# Patient Record
Sex: FEMALE | Race: BLACK OR AFRICAN AMERICAN | NOT HISPANIC OR LATINO | URBAN - METROPOLITAN AREA
[De-identification: names, ages, dates, MRNs, and addresses within clinical notes are randomized per-mention and may not be internally consistent; named-entity substitution may affect disease eponyms.]

---

## 2020-08-23 ENCOUNTER — EMERGENCY (EMERGENCY)
Facility: HOSPITAL | Age: 55
LOS: 1 days | Discharge: HOME | End: 2020-08-23
Attending: EMERGENCY MEDICINE | Admitting: EMERGENCY MEDICINE
Payer: COMMERCIAL

## 2020-08-23 VITALS
RESPIRATION RATE: 18 BRPM | SYSTOLIC BLOOD PRESSURE: 130 MMHG | OXYGEN SATURATION: 99 % | TEMPERATURE: 98 F | HEART RATE: 85 BPM | DIASTOLIC BLOOD PRESSURE: 65 MMHG

## 2020-08-23 PROCEDURE — 99284 EMERGENCY DEPT VISIT MOD MDM: CPT

## 2020-08-23 RX ORDER — KETOROLAC TROMETHAMINE 30 MG/ML
30 SYRINGE (ML) INJECTION ONCE
Refills: 0 | Status: DISCONTINUED | OUTPATIENT
Start: 2020-08-23 | End: 2020-08-23

## 2020-08-23 RX ORDER — SODIUM CHLORIDE 9 MG/ML
1000 INJECTION INTRAMUSCULAR; INTRAVENOUS; SUBCUTANEOUS ONCE
Refills: 0 | Status: COMPLETED | OUTPATIENT
Start: 2020-08-23 | End: 2020-08-23

## 2020-08-23 RX ORDER — METOCLOPRAMIDE HCL 10 MG
10 TABLET ORAL ONCE
Refills: 0 | Status: COMPLETED | OUTPATIENT
Start: 2020-08-23 | End: 2020-08-23

## 2020-08-23 RX ADMIN — Medication 30 MILLIGRAM(S): at 15:56

## 2020-08-23 RX ADMIN — Medication 10 MILLIGRAM(S): at 15:56

## 2020-08-23 RX ADMIN — SODIUM CHLORIDE 1000 MILLILITER(S): 9 INJECTION INTRAMUSCULAR; INTRAVENOUS; SUBCUTANEOUS at 15:56

## 2020-08-23 NOTE — ED PROVIDER NOTE - OBJECTIVE STATEMENT
53 y/o F with PMHx of HTN, high cholesterol and DM presents to the ED c/o ha with gradual onset 5 days ago, and getting progressively worse. Pt says ha is intermittent. Pt notes there is no known aggravating, and alleviating factors. Pt was seen at [facility] days where a CT scan was performed, and results were negative. Pt was advised to take Tylenol, but symptoms have not improved.  Pt denies fever, chills, neck stiffness, numbness, visual changes, and balance problems.

## 2020-08-23 NOTE — ED PROVIDER NOTE - PROGRESS NOTE DETAILS
LAWRENCE HERNANDEZ: Patient signed out to me by Dr. Garcia to f/u and reassess patient for headache. Pt received IVF, Reglan and Toradol. Patient reassessed, sitting comfortably in chair in NAD, denies any complaints. States feeling better, symptoms improved, headache resolved. Discussed with attending, patient can be discharged home to f/u with PCP. The patient was given verbal and written discharge instructions. Specifically, instructions when to return to the ED and when to seek follow-up from their pcp was discussed. Any specialty follow-up was discussed, including how to make an appointment.  Instructions were discussed in simple, plain language and was understood by the patient. The patient understands that should their symptoms worsen or any new symptoms arise, they should return to the ED immediately for further evaluation. All pt's questions were answered. Patient verbalizes understanding.

## 2020-08-23 NOTE — ED PROVIDER NOTE - CLINICAL SUMMARY MEDICAL DECISION MAKING FREE TEXT BOX
53 y/o F with PMHx of HTN, high cholesterol and DM presents to the ED c/o ha with gradual onset 5 days ago, and getting progressively worse. Negative outside hospital CT scan, no red flags for ha. Likely primary in origin. Will treat symptomatically and give OP specialist f/u.

## 2020-08-23 NOTE — ED ADULT TRIAGE NOTE - CHIEF COMPLAINT QUOTE
p/t c/o of headaches for few days on and off, denies any trauma, no neuro deficits noted, p/t seen in another ED for same issue, neg CT scan

## 2020-08-23 NOTE — ED PROVIDER NOTE - NSFOLLOWUPINSTRUCTIONS_ED_ALL_ED_FT
Rest, drink plenty of fluids.  Advance activity as tolerated.  Continue all previously prescribed medications as directed. Take Motrin 600 mg every 8 hours as needed for moderate pain -- take with food. Follow up with your primary care physician in 48-72 hours- bring copies of your results.  Return to the ER for worsening or persistent symptoms, and/or ANY NEW OR CONCERNING SYMPTOMS. If you have issues obtaining follow up, please call: [phone] to obtain a doctor or specialist who takes your insurance in your area.

## 2020-08-23 NOTE — ED PROVIDER NOTE - ATTENDING CONTRIBUTION TO CARE
presenting with generalized pain   Hb 6.4   Reviewed previous admission from July 2018- pt was started on Morphine PCA pump. Will initiate Dilaudid PCA as pt with mild EMEKA at this time  Initiate gentle hydration NS at 50/hr given previous history of fluid overload and LV dysfunction   Trend CBC/LDH/Retic count/T bili  Advise bowel regimen/Incentive spirometry I performed the initial face to face bedside interview with this patient regarding history of present illness, review of symptoms and past medical, social and family history.  I completed an independent physical examination.  I was the initial provider who evaluated this patient.  The history, review of symptoms and examination was documented by the scribe in my presence and I attest to the accuracy of the documentation.  I have signed out the follow up of any pending tests (i.e. labs, radiological studies) to the PA.  I have discussed the patient’s plan of care and disposition with the PA.

## 2020-08-23 NOTE — ED PROVIDER NOTE - PMH
Diabetes mellitus of other type without complication    High cholesterol    Hypertension, unspecified type

## 2020-08-23 NOTE — ED ADULT NURSE NOTE - OBJECTIVE STATEMENT
Pt recd in intake, c/o headache at the top of her head x 5 days. Pt states she takes excedrin with some relief, but the headache comes back. Was seen here for same prior and discharged home with negative findings. Denies N/V, denies dizziness.

## 2020-08-23 NOTE — ED PROVIDER NOTE - PATIENT PORTAL LINK FT
You can access the FollowMyHealth Patient Portal offered by [facility] by registering at the following website: [email or web address]. By joining [facility]’s FollowMyHealth portal, you will also be able to view your health information using other applications (apps) compatible with our system.

## 2023-02-06 NOTE — ED PROVIDER NOTE - CARDIAC, MLM
Normal rate, regular rhythm.  Heart sounds S1, S2.  No murmurs, rubs or gallops. Non-Graft Cartilage Fenestration Text: The cartilage was fenestrated with a 2mm punch biopsy to help facilitate healing.

## 2025-04-13 ENCOUNTER — EMERGENCY (EMERGENCY)
Facility: HOSPITAL | Age: 60
LOS: 0 days | Discharge: HOME | End: 2025-04-13
Payer: MEDICARE

## 2025-04-13 VITALS
DIASTOLIC BLOOD PRESSURE: 83 MMHG | SYSTOLIC BLOOD PRESSURE: 125 MMHG | TEMPERATURE: 98 F | RESPIRATION RATE: 18 BRPM | HEART RATE: 90 BPM | OXYGEN SATURATION: 95 %

## 2025-04-13 VITALS
TEMPERATURE: 98 F | OXYGEN SATURATION: 94 % | DIASTOLIC BLOOD PRESSURE: 84 MMHG | HEIGHT: 60 IN | SYSTOLIC BLOOD PRESSURE: 122 MMHG | HEART RATE: 98 BPM | RESPIRATION RATE: 18 BRPM | WEIGHT: 179.02 LBS

## 2025-04-13 DIAGNOSIS — R04.0 EPISTAXIS: ICD-10-CM

## 2025-04-13 DIAGNOSIS — Z98.890 OTHER SPECIFIED POSTPROCEDURAL STATES: ICD-10-CM

## 2025-04-13 DIAGNOSIS — E11.9 TYPE 2 DIABETES MELLITUS WITHOUT COMPLICATIONS: ICD-10-CM

## 2025-04-13 DIAGNOSIS — I10 ESSENTIAL (PRIMARY) HYPERTENSION: ICD-10-CM

## 2025-04-13 DIAGNOSIS — J45.909 UNSPECIFIED ASTHMA, UNCOMPLICATED: ICD-10-CM

## 2025-04-13 PROBLEM — E78.00 PURE HYPERCHOLESTEROLEMIA, UNSPECIFIED: Chronic | Status: ACTIVE | Noted: 2020-08-23

## 2025-04-13 PROBLEM — E13.9 OTHER SPECIFIED DIABETES MELLITUS WITHOUT COMPLICATIONS: Chronic | Status: ACTIVE | Noted: 2020-08-23

## 2025-04-13 PROCEDURE — 99283 EMERGENCY DEPT VISIT LOW MDM: CPT

## 2025-04-13 NOTE — ED ADULT NURSE NOTE - EXTENSIONS OF SELF_ADULT
Refill requests for Metformin & Metoprolol have been denied as early.  Will be addressed at appt scheduled 2/28/23.  
None

## 2025-04-13 NOTE — ED ADULT NURSE NOTE - CHIEF COMPLAINT
Render Post-Care Instructions In Note?: no Total Number Of Aks Treated: 9 Consent: The patient's consent was obtained including but not limited to risks of crusting, scabbing, blistering, scarring, darker or lighter pigmentary change, recurrence, incomplete removal and infection. Number Of Freeze-Thaw Cycles: 2 freeze-thaw cycles Duration Of Freeze Thaw-Cycle (Seconds): 5 Post-Care Instructions: I reviewed with the patient in detail post-care instructions. Patient is to wear sunprotection, and avoid picking at any of the treated lesions. Pt may apply Vaseline to crusted or scabbing areas. Detail Level: Zone The patient is a 59y Female complaining of epistaxis.

## 2025-04-13 NOTE — ED PROVIDER NOTE - NSFOLLOWUPINSTRUCTIONS_ED_ALL_ED_FT
- You were seen in the Emergency Department Today for Nose bleed   - Please follow up with your ENT as discussed   - Please follow up with your primary care doctor as discussed  - Return to the Emergency Department IMMEDIATELY if you experience Any additional bleeding, lightheadedness or dizziness, you pass out, fevers or chills, chest pain, palpitations, shortness of breath.      English    Nosebleed, Adult  A nosebleed is when blood comes out of the nose. Nosebleeds are common. Usually, they are not a sign of a serious condition.    Nosebleeds can happen if a blood vessel in your nose starts to bleed or if the lining of your nose (mucous membrane) cracks. They are commonly caused by:  Allergies.  Colds.  Picking your nose.  Blowing your nose too hard.  An injury from sticking an object into your nose or getting hit in the nose.  Dry or cold air.  Less common causes of nosebleeds include:  Toxic fumes.  Something abnormal in the nose or in the air-filled spaces in the bones of the face (sinuses).  Growths in the nose, such as polyps.  Blood thinners or conditions that cause blood to clot slowly.  Certain illnesses or procedures that irritate or dry out the nasal passages.  Follow these instructions at home:  When you have a nosebleed:    The correct and incorrect way to hold your head and fingers for first aid during a nosebleed.  Sit down and tilt your head slightly forward.  Use a clean towel or tissue to pinch your nostrils under the bony part of your nose. After 5 minutes, let go of your nose and see if the bleeding starts again. Do not release pressure before that time. If there is still bleeding, repeat the pinching and holding for 5 minutes or until the bleeding stops.  Do not place tissues or gauze in the nose to stop the bleeding.  Avoid lying down and avoid tilting your head backward. That may cause blood to collect in the throat and cause gagging or coughing.  Use a nasal spray decongestant to help with a nosebleed as told by your health care provider.  After a nosebleed:    Avoid blowing your nose or sniffing for a number of hours.  Avoid straining, lifting, or bending at the waist for several days. You may go back to other normal activities as you are able.  If you are taking aspirin or blood thinners and you have nosebleeds, talk to your health care provider. These medicines make bleeding more likely.  Ask your health care provider if you should stop taking the medicines or if you should adjust the dose.  Do not stop taking medicines that your health care provider has recommended unless he or she tells you to stop taking them.  If your nosebleed was caused by dry mucous membranes, use over-the-counter saline nasal spray or gel and a humidifier as told by your health care provider. This will keep the mucous membranes moist and allow them to heal. If you need to use a nasal spray or gel:  Choose one that is water-soluble.  Use only as much as you need and use it only as often as needed.  Do not lie down right after you use it.  If you get nosebleeds often, talk with your health care provider about medical treatments. Options may include:  Nasal cautery. This treatment stops and prevents nosebleeds by using a chemical swab or electrical device to lightly burn tiny blood vessels inside the nose.  Nasal packing. A gauze or other material is placed in the nose to keep constant pressure on the bleeding area.  Contact a health care provider if:  You have a fever.  You get nosebleeds often or more often than usual.  You bruise very easily.  You have a nose bleed from having something stuck in your nose.  You have bleeding in your mouth.  You vomit or cough up brown material.  You have a nosebleed after you start a new medicine.  Get help right away if:  You have a nosebleed after a fall or a head injury.  Your nosebleed does not go away after 20 minutes.  You feel dizzy or weak.  You have unusual bleeding from other parts of your body.  You have unusual bruising on other parts of your body.  You get sweaty.  You vomit blood.  Summary  A nosebleed is when blood comes out of the nose. Common causes include allergies, an injury to the nose, or cold or dry air.  Initial treatment includes applying pressure for 5 minutes.  Moisturizing the nose with saline nasal spray or gel after a nosebleed may help prevent future bleeding.  Get help right away if your nosebleed does not go away after 20 minutes.  This information is not intended to replace advice given to you by your health care provider. Make sure you discuss any questions you have with your health care provider.    Document Revised: 12/27/2022 Document Reviewed: 12/27/2022  Elsevier Patient Education © 2025 Elsevier Inc.  Elsevier logo  Terms and Conditions  Privacy Policy  Editorial Policy  All content on this site: Copyright © 2025 Elsevier, its licensors, and contributors. All rights are reserved, including those for text and data mining, AI training, and similar technologies. For all open access content, the Creative Commons licensing terms apply.  Cookies are used by this site. To decline or learn more, visit our Cookies page.  [employer]

## 2025-04-13 NOTE — ED ADULT NURSE NOTE - OBJECTIVE STATEMENT
PT presented to ER, AOx4 and ambulatory with a walker, reports c/o nosebleed since thus morning. Pt reports cold symptoms, and post op nasal surgery on 3/19. PMH DM, HTN, HLD

## 2025-04-13 NOTE — ED PROVIDER NOTE - PATIENT PORTAL LINK FT
You can access the FollowMyHealth Patient Portal offered by [facility] by registering at the following website: [email or web address]. By joining [facility]’s FollowMyHealth portal, you will also be able to view your health information using other applications (apps) compatible with our system.

## 2025-04-13 NOTE — ED PROVIDER NOTE - AVIAN FLU SYMPTOMS
6/5/17 INR 3.2 Negative assessment findings per Silvia. Will have patient decrease TWD from 26 mg to 24 mg. Patient will take 2mg on Mon and Wed. 4 mg all the other days. Check INR in 2 weeks on 6/19/17. Silvia verbalized an understanding and agrees with this plan.     Call your physician or seek medical care immediately if you notice any of the following symptoms of a bleed:   Red, dark, coffee or cola colored urine  Red or tar like stools  Excessive bleeding from gums or nose  Vomiting coffee colored or bright red material  Coughing up red tinged sputum  Severe or unprovoked pain (ex: severe HA or Abd pain)  Sudden, spontaneous bruising for no reason  Excessive menstrual bleeding  A cut that will not stop bleeding within 10-15 mins  Symptoms associated with abnormal bleeding/high INR reviewed.  Silvia encouraged to avoid activities that may result in a serious fall or injury and verbalizes understanding: Yes     No

## 2025-04-13 NOTE — ED PROVIDER NOTE - PHYSICAL EXAMINATION
CONSTITUTIONAL: Appears well, in no acute distress  HEAD: Normocephalic, no obvious signs of trauma  EENT: PERRL, nares patent, no active bleeding, dried blood left nostril, no pharyngeal erythema, swelling, or exudates  NECK: Trachea midline, no goiter  RESP: L/S equal clr, bilat, apices and bases, no accessory muscle use, speaking full sentences  CARDIC: RRR, +S1/S2, no peripheral edema  GI: ABD soft, nondistended, nontender on palpation, no palpable masses  MSK: moves all extremities  SKIN: No rashes, normal color/condition   NEURO: A&OX4, No focal motor deficits/weakness, no slurred speech, no facial droop, normal gait

## 2025-04-13 NOTE — ED PROVIDER NOTE - OBJECTIVE STATEMENT
59-year-old female past medical history DM, hypertension, asthma, home O2 status post COVID 1 year ago presenting to the ED complaining of epistaxis.  Patient states had recent surgery on the left nare on 3/19 at [facility] by , patient is unsure of procedure but states had it done because her left nostril was not draining properly.  Patient states this morning at approximately 6 AM had coughed and began to bleed from her left nostril.  States bleeding had resolved on its own at approximately 6:30 AM, states take aspirin no other blood thinners.  Patient denies lightheadedness or dizziness, shortness of breath, palpitations, chest pain.
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
## 2025-04-13 NOTE — ED ADULT TRIAGE NOTE - CHIEF COMPLAINT QUOTE
BIBEMS c/o nose bleed since this morning after cough and stopped after NS nasal spray, reports nasal surgery 3/19 in [place], then had lumbar tap. PMH HTN, HLD, Asthma, DM, PNA s/p COVID on 2L nc PRN.

## 2025-04-13 NOTE — ED ADULT NURSE NOTE - CHIEF COMPLAINT QUOTE
BIBEMS c/o nose bleed since this morning after cough and stopped after NS nasal spray, reports nasal surgery 3/19 in [place], then had lumbar tap. PMH HTN, HLD, Asthma, DM, PNA s/p COVID on 2L nc PRN.

## 2025-04-13 NOTE — ED PROVIDER NOTE - NSICDXPASTMEDICALHX_GEN_ALL_CORE_FT
PAST MEDICAL HISTORY:  Diabetes mellitus of other type without complication     High cholesterol     Hypertension, unspecified type

## 2025-04-13 NOTE — ED PROVIDER NOTE - CLINICAL SUMMARY MEDICAL DECISION MAKING FREE TEXT BOX
- 59-year-old female past medical history DM, hypertension, asthma, home O2 status post COVID 1 year ago presenting to the ED complaining of epistaxis.  Patient states had recent surgery on the left nare on 3/19 at [facility] by , patient is unsure of procedure but states had it done because her left nostril was not draining properly.  Patient states this morning at approximately 6 AM had coughed and began to bleed from her left nostril.  States bleeding had resolved on its own at approximately 6:30 AM, states take aspirin no other blood thinners.  Patient denies lightheadedness or dizziness, shortness of breath, palpitations, chest pain.  - On exam patient not actively bleeding, nares patent, dried blood left nostril, no hematoma.  Patient denies any chest pain, palpitations, shortness of breath-low suspicion for anemia.  Patient states had requested to be transported to [facility] where she had procedure done, was denied by EMS and taken to [place].  No acute intervention required at this time, discussed with patient who will attempt to contact ENT who performed procedure, patient has follow-up appointment.  Patient states will return to [place] begins to bleed again.  In shared decision making we will discharge with ENT follow-up.  Patient is agreeable to plan, questions answered and understanding verbalized, return precautions given.

## 2025-06-02 ENCOUNTER — INPATIENT (INPATIENT)
Facility: HOSPITAL | Age: 60
LOS: 3 days | Discharge: HOME HEALTH | End: 2025-06-06
Attending: GENERAL ACUTE CARE HOSPITAL | Admitting: GENERAL ACUTE CARE HOSPITAL
Payer: MEDICARE

## 2025-06-02 VITALS
TEMPERATURE: 98 F | HEART RATE: 112 BPM | RESPIRATION RATE: 18 BRPM | WEIGHT: 181 LBS | SYSTOLIC BLOOD PRESSURE: 142 MMHG | DIASTOLIC BLOOD PRESSURE: 79 MMHG | HEIGHT: 60 IN | OXYGEN SATURATION: 63 %

## 2025-06-02 DIAGNOSIS — R06.02 SHORTNESS OF BREATH: ICD-10-CM

## 2025-06-02 DIAGNOSIS — Z11.52 ENCOUNTER FOR SCREENING FOR COVID-19: ICD-10-CM

## 2025-06-02 DIAGNOSIS — D50.9 IRON DEFICIENCY ANEMIA, UNSPECIFIED: ICD-10-CM

## 2025-06-02 DIAGNOSIS — Z79.84 LONG TERM (CURRENT) USE OF ORAL HYPOGLYCEMIC DRUGS: ICD-10-CM

## 2025-06-02 DIAGNOSIS — J45.901 UNSPECIFIED ASTHMA WITH (ACUTE) EXACERBATION: ICD-10-CM

## 2025-06-02 DIAGNOSIS — E66.9 OBESITY, UNSPECIFIED: ICD-10-CM

## 2025-06-02 DIAGNOSIS — I10 ESSENTIAL (PRIMARY) HYPERTENSION: ICD-10-CM

## 2025-06-02 DIAGNOSIS — E87.29 OTHER ACIDOSIS: ICD-10-CM

## 2025-06-02 DIAGNOSIS — Z79.899 OTHER LONG TERM (CURRENT) DRUG THERAPY: ICD-10-CM

## 2025-06-02 DIAGNOSIS — J44.1 CHRONIC OBSTRUCTIVE PULMONARY DISEASE WITH (ACUTE) EXACERBATION: ICD-10-CM

## 2025-06-02 DIAGNOSIS — Z87.891 PERSONAL HISTORY OF NICOTINE DEPENDENCE: ICD-10-CM

## 2025-06-02 DIAGNOSIS — Z79.4 LONG TERM (CURRENT) USE OF INSULIN: ICD-10-CM

## 2025-06-02 DIAGNOSIS — Z79.51 LONG TERM (CURRENT) USE OF INHALED STEROIDS: ICD-10-CM

## 2025-06-02 DIAGNOSIS — E11.9 TYPE 2 DIABETES MELLITUS WITHOUT COMPLICATIONS: ICD-10-CM

## 2025-06-02 DIAGNOSIS — N28.1 CYST OF KIDNEY, ACQUIRED: ICD-10-CM

## 2025-06-02 DIAGNOSIS — J96.21 ACUTE AND CHRONIC RESPIRATORY FAILURE WITH HYPOXIA: ICD-10-CM

## 2025-06-02 DIAGNOSIS — J18.9 PNEUMONIA, UNSPECIFIED ORGANISM: ICD-10-CM

## 2025-06-02 DIAGNOSIS — Z79.82 LONG TERM (CURRENT) USE OF ASPIRIN: ICD-10-CM

## 2025-06-02 DIAGNOSIS — J96.22 ACUTE AND CHRONIC RESPIRATORY FAILURE WITH HYPERCAPNIA: ICD-10-CM

## 2025-06-02 DIAGNOSIS — E78.00 PURE HYPERCHOLESTEROLEMIA, UNSPECIFIED: ICD-10-CM

## 2025-06-02 LAB
ALBUMIN SERPL ELPH-MCNC: 3.3 G/DL — SIGNIFICANT CHANGE UP (ref 3.3–5)
ALP SERPL-CCNC: 106 U/L — SIGNIFICANT CHANGE UP (ref 40–120)
ALT FLD-CCNC: 54 U/L — SIGNIFICANT CHANGE UP (ref 12–78)
ANION GAP SERPL CALC-SCNC: 6 MMOL/L — SIGNIFICANT CHANGE UP (ref 5–17)
AST SERPL-CCNC: 44 U/L — HIGH (ref 15–37)
BASE EXCESS BLDA CALC-SCNC: -1.4 MMOL/L — SIGNIFICANT CHANGE UP (ref -2–3)
BASE EXCESS BLDV CALC-SCNC: 0.1 MMOL/L — SIGNIFICANT CHANGE UP (ref -2–3)
BASE EXCESS BLDV CALC-SCNC: 0.9 MMOL/L — SIGNIFICANT CHANGE UP (ref -2–3)
BASOPHILS # BLD AUTO: 0.02 K/UL — SIGNIFICANT CHANGE UP (ref 0–0.2)
BASOPHILS NFR BLD AUTO: 0.2 % — SIGNIFICANT CHANGE UP (ref 0–2)
BILIRUB SERPL-MCNC: 0.5 MG/DL — SIGNIFICANT CHANGE UP (ref 0.2–1.2)
BLOOD GAS COMMENTS ARTERIAL: SIGNIFICANT CHANGE UP
BLOOD GAS COMMENTS, VENOUS: SIGNIFICANT CHANGE UP
BLOOD GAS COMMENTS, VENOUS: SIGNIFICANT CHANGE UP
BUN SERPL-MCNC: 17 MG/DL — SIGNIFICANT CHANGE UP (ref 7–23)
CALCIUM SERPL-MCNC: 9.1 MG/DL — SIGNIFICANT CHANGE UP (ref 8.5–10.1)
CHLORIDE BLDV-SCNC: 113 MMOL/L — HIGH (ref 98–107)
CHLORIDE BLDV-SCNC: 116 MMOL/L — HIGH (ref 98–107)
CHLORIDE SERPL-SCNC: 117 MMOL/L — HIGH (ref 96–108)
CO2 BLDA-SCNC: 29 MMOL/L — HIGH (ref 19–24)
CO2 BLDV-SCNC: 29 MMOL/L — HIGH (ref 22–26)
CO2 BLDV-SCNC: 30 MMOL/L — HIGH (ref 22–26)
CO2 SERPL-SCNC: 26 MMOL/L — SIGNIFICANT CHANGE UP (ref 22–31)
CREAT SERPL-MCNC: 0.86 MG/DL — SIGNIFICANT CHANGE UP (ref 0.5–1.3)
EGFR: 77 ML/MIN/1.73M2 — SIGNIFICANT CHANGE UP
EGFR: 77 ML/MIN/1.73M2 — SIGNIFICANT CHANGE UP
EOSINOPHIL # BLD AUTO: 0.01 K/UL — SIGNIFICANT CHANGE UP (ref 0–0.5)
EOSINOPHIL NFR BLD AUTO: 0.1 % — SIGNIFICANT CHANGE UP (ref 0–6)
FLUAV AG NPH QL: SIGNIFICANT CHANGE UP
FLUBV AG NPH QL: SIGNIFICANT CHANGE UP
GAS PNL BLDA: SIGNIFICANT CHANGE UP
GAS PNL BLDV: 147 MMOL/L — HIGH (ref 136–145)
GAS PNL BLDV: 147 MMOL/L — HIGH (ref 136–145)
GAS PNL BLDV: SIGNIFICANT CHANGE UP
GLUCOSE BLDC GLUCOMTR-MCNC: 102 MG/DL — HIGH (ref 70–99)
GLUCOSE BLDC GLUCOMTR-MCNC: 127 MG/DL — HIGH (ref 70–99)
GLUCOSE BLDV-MCNC: 151 MG/DL — HIGH (ref 65–95)
GLUCOSE BLDV-MCNC: 94 MG/DL — SIGNIFICANT CHANGE UP (ref 65–95)
GLUCOSE SERPL-MCNC: 101 MG/DL — HIGH (ref 70–99)
HCO3 BLDA-SCNC: 27 MMOL/L — SIGNIFICANT CHANGE UP (ref 21–28)
HCO3 BLDV-SCNC: 28 MMOL/L — SIGNIFICANT CHANGE UP (ref 22–28)
HCO3 BLDV-SCNC: 28 MMOL/L — SIGNIFICANT CHANGE UP (ref 22–28)
HCT VFR BLD CALC: 26.3 % — LOW (ref 34.5–45)
HCT VFR BLDA CALC: 22 % — LOW (ref 37–47)
HCT VFR BLDA CALC: 24 % — LOW (ref 37–47)
HGB BLD CALC-MCNC: 7.4 G/DL — LOW (ref 11.7–16.1)
HGB BLD CALC-MCNC: 7.9 G/DL — LOW (ref 11.7–16.1)
HGB BLD-MCNC: 7.5 G/DL — LOW (ref 11.5–15.5)
HOROWITZ INDEX BLDA+IHG-RTO: 50 — SIGNIFICANT CHANGE UP
HOROWITZ INDEX BLDV+IHG-RTO: 50 — SIGNIFICANT CHANGE UP
HOROWITZ INDEX BLDV+IHG-RTO: 50 — SIGNIFICANT CHANGE UP
IMM GRANULOCYTES NFR BLD AUTO: 0.6 % — SIGNIFICANT CHANGE UP (ref 0–0.9)
LACTATE BLDV-MCNC: 0.8 MMOL/L — SIGNIFICANT CHANGE UP (ref 0.56–1.39)
LACTATE BLDV-MCNC: 1.2 MMOL/L — SIGNIFICANT CHANGE UP (ref 0.56–1.39)
LYMPHOCYTES # BLD AUTO: 1.82 K/UL — SIGNIFICANT CHANGE UP (ref 1–3.3)
LYMPHOCYTES # BLD AUTO: 14.4 % — SIGNIFICANT CHANGE UP (ref 13–44)
MCHC RBC-ENTMCNC: 25 PG — LOW (ref 27–34)
MCHC RBC-ENTMCNC: 28.5 G/DL — LOW (ref 32–36)
MCV RBC AUTO: 87.7 FL — SIGNIFICANT CHANGE UP (ref 80–100)
MONOCYTES # BLD AUTO: 0.9 K/UL — SIGNIFICANT CHANGE UP (ref 0–0.9)
MONOCYTES NFR BLD AUTO: 7.1 % — SIGNIFICANT CHANGE UP (ref 2–14)
NEUTROPHILS # BLD AUTO: 9.82 K/UL — HIGH (ref 1.8–7.4)
NEUTROPHILS NFR BLD AUTO: 77.6 % — HIGH (ref 43–77)
NRBC BLD AUTO-RTO: 0 /100 WBCS — SIGNIFICANT CHANGE UP (ref 0–0)
NT-PROBNP SERPL-SCNC: 4823 PG/ML — HIGH (ref 0–125)
PCO2 BLDA: 60 MMHG — HIGH (ref 32–46)
PCO2 BLDV: 56 MMHG — HIGH (ref 42–55)
PCO2 BLDV: 67 MMHG — HIGH (ref 42–55)
PH BLDA: 7.26 — LOW (ref 7.35–7.45)
PH BLDV: 7.23 — LOW (ref 7.32–7.43)
PH BLDV: 7.3 — LOW (ref 7.32–7.43)
PLATELET # BLD AUTO: 315 K/UL — SIGNIFICANT CHANGE UP (ref 150–400)
PO2 BLDA: 103 MMHG — SIGNIFICANT CHANGE UP (ref 83–108)
PO2 BLDV: 33 MMHG — SIGNIFICANT CHANGE UP (ref 25–45)
PO2 BLDV: 43 MMHG — SIGNIFICANT CHANGE UP (ref 25–45)
POTASSIUM BLDV-SCNC: 4.2 MMOL/L — SIGNIFICANT CHANGE UP (ref 3.5–5.1)
POTASSIUM BLDV-SCNC: 5.1 MMOL/L — SIGNIFICANT CHANGE UP (ref 3.5–5.1)
POTASSIUM SERPL-MCNC: 4.2 MMOL/L — SIGNIFICANT CHANGE UP (ref 3.5–5.3)
POTASSIUM SERPL-SCNC: 4.2 MMOL/L — SIGNIFICANT CHANGE UP (ref 3.5–5.3)
PROT SERPL-MCNC: 7 GM/DL — SIGNIFICANT CHANGE UP (ref 6–8.3)
RBC # BLD: 3 M/UL — LOW (ref 3.8–5.2)
RBC # FLD: 18.5 % — HIGH (ref 10.3–14.5)
RSV RNA NPH QL NAA+NON-PROBE: SIGNIFICANT CHANGE UP
SAO2 % BLDA: 98.4 % — HIGH (ref 94–98)
SAO2 % BLDV: 47.2 % — LOW (ref 94–98)
SAO2 % BLDV: 66.4 % — LOW (ref 94–98)
SARS-COV-2 RNA SPEC QL NAA+PROBE: SIGNIFICANT CHANGE UP
SODIUM SERPL-SCNC: 149 MMOL/L — HIGH (ref 135–145)
SOURCE RESPIRATORY: SIGNIFICANT CHANGE UP
TROPONIN I, HIGH SENSITIVITY RESULT: 25.9 NG/L — SIGNIFICANT CHANGE UP
WBC # BLD: 12.64 K/UL — HIGH (ref 3.8–10.5)
WBC # FLD AUTO: 12.64 K/UL — HIGH (ref 3.8–10.5)

## 2025-06-02 PROCEDURE — 99222 1ST HOSP IP/OBS MODERATE 55: CPT

## 2025-06-02 PROCEDURE — 71045 X-RAY EXAM CHEST 1 VIEW: CPT | Mod: 26

## 2025-06-02 PROCEDURE — 99285 EMERGENCY DEPT VISIT HI MDM: CPT

## 2025-06-02 PROCEDURE — 71275 CT ANGIOGRAPHY CHEST: CPT | Mod: 26

## 2025-06-02 PROCEDURE — 99285 EMERGENCY DEPT VISIT HI MDM: CPT | Mod: 25

## 2025-06-02 RX ORDER — AZITHROMYCIN 250 MG
500 CAPSULE ORAL DAILY
Refills: 0 | Status: DISCONTINUED | OUTPATIENT
Start: 2025-06-03 | End: 2025-06-04

## 2025-06-02 RX ORDER — INSULIN GLARGINE-YFGN 100 [IU]/ML
15 INJECTION, SOLUTION SUBCUTANEOUS AT BEDTIME
Refills: 0 | Status: DISCONTINUED | OUTPATIENT
Start: 2025-06-02 | End: 2025-06-06

## 2025-06-02 RX ORDER — AMLODIPINE BESYLATE 10 MG/1
1 TABLET ORAL
Refills: 0 | DISCHARGE

## 2025-06-02 RX ORDER — GLUCAGON 3 MG/1
1 POWDER NASAL ONCE
Refills: 0 | Status: DISCONTINUED | OUTPATIENT
Start: 2025-06-02 | End: 2025-06-06

## 2025-06-02 RX ORDER — SPIRONOLACTONE 25 MG
25 TABLET ORAL DAILY
Refills: 0 | Status: DISCONTINUED | OUTPATIENT
Start: 2025-06-02 | End: 2025-06-06

## 2025-06-02 RX ORDER — SODIUM CHLORIDE 9 G/1000ML
1000 INJECTION, SOLUTION INTRAVENOUS
Refills: 0 | Status: DISCONTINUED | OUTPATIENT
Start: 2025-06-02 | End: 2025-06-06

## 2025-06-02 RX ORDER — IPRATROPIUM BROMIDE AND ALBUTEROL SULFATE .5; 2.5 MG/3ML; MG/3ML
3 SOLUTION RESPIRATORY (INHALATION) ONCE
Refills: 0 | Status: COMPLETED | OUTPATIENT
Start: 2025-06-02 | End: 2025-06-02

## 2025-06-02 RX ORDER — LOSARTAN POTASSIUM AND HYDROCHLOROTHIAZIDE 12.5; 5 MG/1; MG/1
1 TABLET ORAL
Refills: 0 | DISCHARGE

## 2025-06-02 RX ORDER — INSULIN LISPRO 100 U/ML
INJECTION, SOLUTION INTRAVENOUS; SUBCUTANEOUS AT BEDTIME
Refills: 0 | Status: DISCONTINUED | OUTPATIENT
Start: 2025-06-02 | End: 2025-06-06

## 2025-06-02 RX ORDER — ONDANSETRON HCL/PF 4 MG/2 ML
4 VIAL (ML) INJECTION EVERY 8 HOURS
Refills: 0 | Status: DISCONTINUED | OUTPATIENT
Start: 2025-06-02 | End: 2025-06-06

## 2025-06-02 RX ORDER — CEFTRIAXONE 500 MG/1
1000 INJECTION, POWDER, FOR SOLUTION INTRAMUSCULAR; INTRAVENOUS ONCE
Refills: 0 | Status: COMPLETED | OUTPATIENT
Start: 2025-06-02 | End: 2025-06-02

## 2025-06-02 RX ORDER — INSULIN GLARGINE-YFGN 100 [IU]/ML
30 INJECTION, SOLUTION SUBCUTANEOUS
Refills: 0 | DISCHARGE

## 2025-06-02 RX ORDER — METHYLPREDNISOLONE ACETATE 80 MG/ML
40 INJECTION, SUSPENSION INTRA-ARTICULAR; INTRALESIONAL; INTRAMUSCULAR; SOFT TISSUE DAILY
Refills: 0 | Status: DISCONTINUED | OUTPATIENT
Start: 2025-06-03 | End: 2025-06-05

## 2025-06-02 RX ORDER — INSULIN LISPRO 100 U/ML
INJECTION, SOLUTION INTRAVENOUS; SUBCUTANEOUS
Refills: 0 | Status: DISCONTINUED | OUTPATIENT
Start: 2025-06-02 | End: 2025-06-06

## 2025-06-02 RX ORDER — CEFTRIAXONE 500 MG/1
1000 INJECTION, POWDER, FOR SOLUTION INTRAMUSCULAR; INTRAVENOUS EVERY 24 HOURS
Refills: 0 | Status: DISCONTINUED | OUTPATIENT
Start: 2025-06-03 | End: 2025-06-06

## 2025-06-02 RX ORDER — ENOXAPARIN SODIUM 100 MG/ML
40 INJECTION SUBCUTANEOUS EVERY 24 HOURS
Refills: 0 | Status: DISCONTINUED | OUTPATIENT
Start: 2025-06-02 | End: 2025-06-06

## 2025-06-02 RX ORDER — DEXTROSE 50 % IN WATER 50 %
15 SYRINGE (ML) INTRAVENOUS ONCE
Refills: 0 | Status: DISCONTINUED | OUTPATIENT
Start: 2025-06-02 | End: 2025-06-06

## 2025-06-02 RX ORDER — METHYLPREDNISOLONE ACETATE 80 MG/ML
40 INJECTION, SUSPENSION INTRA-ARTICULAR; INTRALESIONAL; INTRAMUSCULAR; SOFT TISSUE ONCE
Refills: 0 | Status: DISCONTINUED | OUTPATIENT
Start: 2025-06-02 | End: 2025-06-02

## 2025-06-02 RX ORDER — AZITHROMYCIN 250 MG
500 CAPSULE ORAL ONCE
Refills: 0 | Status: COMPLETED | OUTPATIENT
Start: 2025-06-02 | End: 2025-06-02

## 2025-06-02 RX ORDER — LOSARTAN POTASSIUM 100 MG/1
100 TABLET, FILM COATED ORAL DAILY
Refills: 0 | Status: DISCONTINUED | OUTPATIENT
Start: 2025-06-02 | End: 2025-06-06

## 2025-06-02 RX ORDER — DEXTROSE 50 % IN WATER 50 %
12.5 SYRINGE (ML) INTRAVENOUS ONCE
Refills: 0 | Status: DISCONTINUED | OUTPATIENT
Start: 2025-06-02 | End: 2025-06-06

## 2025-06-02 RX ORDER — ASPIRIN 325 MG
81 TABLET ORAL DAILY
Refills: 0 | Status: DISCONTINUED | OUTPATIENT
Start: 2025-06-02 | End: 2025-06-06

## 2025-06-02 RX ORDER — ACETAMINOPHEN 500 MG/5ML
650 LIQUID (ML) ORAL EVERY 6 HOURS
Refills: 0 | Status: DISCONTINUED | OUTPATIENT
Start: 2025-06-02 | End: 2025-06-06

## 2025-06-02 RX ORDER — MAGNESIUM, ALUMINUM HYDROXIDE 200-200 MG
30 TABLET,CHEWABLE ORAL EVERY 4 HOURS
Refills: 0 | Status: DISCONTINUED | OUTPATIENT
Start: 2025-06-02 | End: 2025-06-06

## 2025-06-02 RX ORDER — METOPROLOL SUCCINATE 50 MG/1
100 TABLET, EXTENDED RELEASE ORAL DAILY
Refills: 0 | Status: DISCONTINUED | OUTPATIENT
Start: 2025-06-02 | End: 2025-06-06

## 2025-06-02 RX ORDER — SPIRONOLACTONE 25 MG
1 TABLET ORAL
Refills: 0 | DISCHARGE

## 2025-06-02 RX ORDER — DEXTROSE 50 % IN WATER 50 %
25 SYRINGE (ML) INTRAVENOUS ONCE
Refills: 0 | Status: DISCONTINUED | OUTPATIENT
Start: 2025-06-02 | End: 2025-06-06

## 2025-06-02 RX ORDER — METOPROLOL SUCCINATE 50 MG/1
1 TABLET, EXTENDED RELEASE ORAL
Refills: 0 | DISCHARGE

## 2025-06-02 RX ORDER — ASPIRIN 325 MG
1 TABLET ORAL
Refills: 0 | DISCHARGE

## 2025-06-02 RX ORDER — IPRATROPIUM BROMIDE AND ALBUTEROL SULFATE .5; 2.5 MG/3ML; MG/3ML
3 SOLUTION RESPIRATORY (INHALATION) EVERY 6 HOURS
Refills: 0 | Status: DISCONTINUED | OUTPATIENT
Start: 2025-06-02 | End: 2025-06-06

## 2025-06-02 RX ORDER — AMLODIPINE BESYLATE 10 MG/1
10 TABLET ORAL DAILY
Refills: 0 | Status: DISCONTINUED | OUTPATIENT
Start: 2025-06-02 | End: 2025-06-06

## 2025-06-02 RX ORDER — METFORMIN HYDROCHLORIDE 850 MG/1
1 TABLET ORAL
Refills: 0 | DISCHARGE

## 2025-06-02 RX ORDER — MELATONIN 5 MG
3 TABLET ORAL AT BEDTIME
Refills: 0 | Status: DISCONTINUED | OUTPATIENT
Start: 2025-06-02 | End: 2025-06-06

## 2025-06-02 RX ORDER — METHYLPREDNISOLONE ACETATE 80 MG/ML
60 INJECTION, SUSPENSION INTRA-ARTICULAR; INTRALESIONAL; INTRAMUSCULAR; SOFT TISSUE ONCE
Refills: 0 | Status: COMPLETED | OUTPATIENT
Start: 2025-06-02 | End: 2025-06-02

## 2025-06-02 RX ADMIN — IPRATROPIUM BROMIDE AND ALBUTEROL SULFATE 3 MILLILITER(S): .5; 2.5 SOLUTION RESPIRATORY (INHALATION) at 17:05

## 2025-06-02 RX ADMIN — IPRATROPIUM BROMIDE AND ALBUTEROL SULFATE 3 MILLILITER(S): .5; 2.5 SOLUTION RESPIRATORY (INHALATION) at 09:05

## 2025-06-02 RX ADMIN — Medication 1 DOSE(S): at 20:59

## 2025-06-02 RX ADMIN — CEFTRIAXONE 100 MILLIGRAM(S): 500 INJECTION, POWDER, FOR SOLUTION INTRAMUSCULAR; INTRAVENOUS at 12:06

## 2025-06-02 RX ADMIN — METHYLPREDNISOLONE ACETATE 60 MILLIGRAM(S): 80 INJECTION, SUSPENSION INTRA-ARTICULAR; INTRALESIONAL; INTRAMUSCULAR; SOFT TISSUE at 09:04

## 2025-06-02 RX ADMIN — Medication 255 MILLIGRAM(S): at 14:10

## 2025-06-02 NOTE — H&P ADULT - HISTORY OF PRESENT ILLNESS
This is a 59 year old F with PMH HTN, DM, COPD/asthma presenting with shortness of breath and cough which started yesterday. Denies fever, chills, chest pain, abdominal pain, nausea, vomiting. States she has had multiple episodes of pneumonia. History limited due to patient being on BIPAP.     In ED, patient spO2 in the 80s on room air, placed on bipap. VBG 7.3/56/43/28. Labs revealed WBC 12.64, hgb 7.5, Na 149, AST/ALT 44/54, pro-BNP 4823. CTA negative for PE, but suggest multifocal pneumonia. Recieved duonebs, steroids, ctx, azithro in ED. Upon evaluation, patient continues on bipap with improvement of symptoms.

## 2025-06-02 NOTE — CONSULT NOTE ADULT - NS ATTEND AMEND GEN_ALL_CORE FT
pt seen and examined in ER with NP    pulmonary consulted for respiratory failure, hypoxia/hypercarbia      59F ex smoker 1/2 PPD x 25 years quit 2009 PMH HTN, HLD, DM, COPD and asthma (follows at [facility]: reports being maintained on Symbicort, prn albuterol, montelukast and uses home O2 2L prn) presents for cough, wheeze, SOB starting worsening since yesterday. States she started using her O2 at home due to SOB and has been using her albuterol daily for past 2-3 days. Reports symptoms started a week ago but has progressively worsened. Denies fever and chills. No recent travel. Found to be hypoxic on 4L to the 60s. Blood gas 7.3/56/43/28/66%. CXR with bilateral infiltrates. CTA with bilateral opacities consistent with multifocal PNA. WBC 12.6, proBNP 4823. Pulmonary consulted for management    DX: acute on chronic hypoxic respiratory failure, acute hypercarbic respiratory failure, multifocal PNA, acute COPD / asthma exacerbation    - pt on BiPAP IPAP 10 and EPAP 5 with FIO2: 50%  - repeat blood gas on BiPAP with ABG 7.26/60/103/27/98%  - pt awake and alert  - bipap settings adjusted with IPAP increased to 14 and back up rate increased to 14 as well. pt taking TV of around 350 with increased iPAP  - will repeat ABG in AM  - wean off BiPAP as tolerates in AM, would cont on bipap overnight  - CTA chest reviewed and with multifocal bilateral opacities  - will treat for CAP: azithromycin and ceftriaxone  - check viral panel swab  - check sputum cx  - check strep Ag, legionella, mycoplasma  - agree with solumderol 40mg daily for copd/asthma exacerbation  - duonebs q6 hours  - cont montelukast  - on symbicort at home (hopsital interchange for symbicort with advair)  - goal to maintain O2 sat > 90%  - hope to wean off bipap to NC in AM if tolerates  - pt admitted to medicine pending medical floor bed  - remainder of care per medicine  - d/w respiratory therapist

## 2025-06-02 NOTE — CONSULT NOTE ADULT - SUBJECTIVE AND OBJECTIVE BOX
HPI:  This is a 59 year old F with PMH HTN, DM, COPD/asthma presenting with shortness of breath and cough which started yesterday. Denies fever, chills, chest pain, abdominal pain, nausea, vomiting. States she has had multiple episodes of pneumonia. History limited due to patient being on BIPAP.     In ED, patient spO2 in the 80s on room air, placed on bipap. VBG 7.3/56/43/28. Labs revealed WBC 12.64, hgb 7.5, Na 149, AST/ALT 44/54, pro-BNP 4823. CTA negative for PE, but suggest multifocal pneumonia. Recieved duonebs, steroids, ctx, azithro in ED. Upon evaluation, patient continues on bipap with improvement of symptoms.  (02 Jun 2025 14:45)      PAST MEDICAL & SURGICAL HISTORY:  Hypertension, unspecified type      High cholesterol      Diabetes mellitus of other type without complication      No significant past surgical history          FAMILY HISTORY:      SOCIAL HISTORY:  Smoking: __ packs x ___ years  EtOH Use:  Marital Status:  Occupation:  Exposures:  Recent Travel:    Allergies    No Known Allergies    Intolerances        HOME MEDICATIONS:    REVIEW OF SYSTEMS:  Constitutional: No fevers or chills. No weight loss. No fatigue or generalised malaise.  Eyes: No itching or discharge from the eyes  ENT: No ear pain. No ear discharge. No nasal congestion. No post nasal drip. No epistaxis. No throat pain. No sore throat. No difficulty swallowing.   CV: No chest pain. No palpitations. No lightheadedness or dizziness.   Resp: +dyspnea/SOB. No orthopnea. + wheezing. + cough. No stridor. + sputum production. No chest pain with respiration.  GI: No nausea. No vomiting. No diarrhea.  MSK: No joint pain or pain in any extremities  Integumentary: No skin lesions. No pedal edema.  Neurological: No gross motor weakness. No sensory changes.    [ ] All other systems negative  [ ] Unable to assess ROS because ________    OBJECTIVE:  Vital Signs Last 24 Hrs  T(C): 37.1 (02 Jun 2025 12:17), Max: 37.1 (02 Jun 2025 12:17)  T(F): 98.7 (02 Jun 2025 12:17), Max: 98.7 (02 Jun 2025 12:17)  HR: 85 (02 Jun 2025 17:30) (78 - 112)  BP: 117/70 (02 Jun 2025 17:30) (117/70 - 142/79)  BP(mean): --  ABP: --  ABP(mean): --  RR: 20 (02 Jun 2025 17:30) (18 - 32)  SpO2: 95% (02 Jun 2025 17:30) (63% - 100%)    O2 Parameters below as of 02 Jun 2025 17:30  Patient On (Oxygen Delivery Method): BiPAP/CPAP              CAPILLARY BLOOD GLUCOSE      POCT Blood Glucose.: 127 mg/dL (02 Jun 2025 16:59)      PHYSICAL EXAM:  General: Awake, alert, oriented X 3. on BIPAP  HEENT: Atraumatic, normocephalic. PERRL  Neck: No JVD. supple.   Respiratory: Mild wheeze, no rhonchi or rales  Cardiovascular: RRR  Abdomen: Soft, non-tender, non-distended. +BS  Extremities: Warm to touch. Peripheral pulse palpable. No pedal edema.   Skin: No rashes or skin lesions  Neurological: Non-focal  Psychiatry: Appropriate mood and affect.    HOSPITAL MEDICATIONS:  MEDICATIONS  (STANDING):  albuterol/ipratropium for Nebulization 3 milliLiter(s) Nebulizer every 6 hours  amLODIPine   Tablet 10 milliGRAM(s) Oral daily  aspirin  chewable 81 milliGRAM(s) Oral daily  azithromycin  IVPB 500 milliGRAM(s) IV Intermittent daily  cefTRIAXone   IVPB 1000 milliGRAM(s) IV Intermittent every 24 hours  dextrose 5%. 1000 milliLiter(s) (50 mL/Hr) IV Continuous <Continuous>  dextrose 5%. 1000 milliLiter(s) (100 mL/Hr) IV Continuous <Continuous>  dextrose 50% Injectable 25 Gram(s) IV Push once  dextrose 50% Injectable 12.5 Gram(s) IV Push once  dextrose 50% Injectable 25 Gram(s) IV Push once  enoxaparin Injectable 40 milliGRAM(s) SubCutaneous every 24 hours  fluticasone propionate/ salmeterol 250-50 MICROgram(s) Diskus 1 Dose(s) Inhalation two times a day  glucagon  Injectable 1 milliGRAM(s) IntraMuscular once  insulin glargine Injectable (LANTUS) 15 Unit(s) SubCutaneous at bedtime  insulin lispro (ADMELOG) corrective regimen sliding scale   SubCutaneous three times a day before meals  insulin lispro (ADMELOG) corrective regimen sliding scale   SubCutaneous at bedtime  losartan 100 milliGRAM(s) Oral daily  metoprolol succinate  milliGRAM(s) Oral daily  spironolactone 25 milliGRAM(s) Oral daily    MEDICATIONS  (PRN):  acetaminophen     Tablet .. 650 milliGRAM(s) Oral every 6 hours PRN Temp greater or equal to 38C (100.4F), Mild Pain (1 - 3)  aluminum hydroxide/magnesium hydroxide/simethicone Suspension 30 milliLiter(s) Oral every 4 hours PRN Dyspepsia  dextrose Oral Gel 15 Gram(s) Oral once PRN Blood Glucose LESS THAN 70 milliGRAM(s)/deciliter  melatonin 3 milliGRAM(s) Oral at bedtime PRN Insomnia  ondansetron Injectable 4 milliGRAM(s) IV Push every 8 hours PRN Nausea and/or Vomiting      LABS:                        7.5    12.64 )-----------( 315      ( 02 Jun 2025 08:42 )             26.3     06-02    149[H]  |  117[H]  |  17  ----------------------------<  101[H]  4.2   |  26  |  0.86    Ca    9.1      02 Jun 2025 08:42    TPro  7.0  /  Alb  3.3  /  TBili  0.5  /  DBili  x   /  AST  44[H]  /  ALT  54  /  AlkPhos  106  06-02      Urinalysis Basic - ( 02 Jun 2025 08:42 )    Color: x / Appearance: x / SG: x / pH: x  Gluc: 101 mg/dL / Ketone: x  / Bili: x / Urobili: x   Blood: x / Protein: x / Nitrite: x   Leuk Esterase: x / RBC: x / WBC x   Sq Epi: x / Non Sq Epi: x / Bacteria: x        Venous Blood Gas:  06-02 @ 15:38  7.23/67/33/28/47.2  VBG Lactate: 0.80  Venous Blood Gas:  06-02 @ 09:09  7.30/56/43/28/66.4  VBG Lactate: 1.20      MICROBIOLOGY:             RADIOLOGY:    < from: CT Angio Chest PE Protocol w/ IV Cont (06.02.25 @ 11:21) >  FINDINGS:    LUNGS AND LARGE AIRWAYS: Patent central airways. Patchy airspace   opacities throughout both lungs, suspicious for pneumonia. Right middle   lobe atelectasis.  PLEURA: No pleural effusion.  VESSELS: No evidence of pulmonary embolism. Coronary artery   calcifications.  HEART: Heart size is enlarged. No pericardial effusion.  MEDIASTINUM AND CHIARA: No lymphadenopathy.  CHEST WALL AND LOWER NECK: Within normal limits.  VISUALIZED UPPER ABDOMEN: Exophytic right upper pole renal cyst.  BONES: Degenerative changes.    IMPRESSION:  No evidence of pulmonary embolism. Multifocal pneumonia.        --- End of Report ---    < end of copied text > HPI:  This is a 59 year old F with PMH HTN, DM, COPD/asthma presenting with shortness of breath and cough which started yesterday. Denies fever, chills, chest pain, abdominal pain, nausea, vomiting. States she has had multiple episodes of pneumonia. History limited due to patient being on BIPAP.     In ED, patient spO2 in the 80s on room air, placed on bipap. VBG 7.3/56/43/28. Labs revealed WBC 12.64, hgb 7.5, Na 149, AST/ALT 44/54, pro-BNP 4823. CTA negative for PE, but suggest multifocal pneumonia. Recieved duonebs, steroids, ctx, azithro in ED. Upon evaluation, patient continues on bipap with improvement of symptoms.  (2025 14:45)      PAST MEDICAL & SURGICAL HISTORY:  Hypertension, unspecified type      High cholesterol      Diabetes mellitus of other type without complication      No significant past surgical history          FAMILY HISTORY:      SOCIAL HISTORY:  Smokin/2PPD x25 years, quit   EtOH Use:  Marital Status:  Occupation:  Exposures: denies  Recent Travel: denies    Allergies  No Known Allergies        REVIEW OF SYSTEMS:  Constitutional: No fevers or chills. No weight loss. No fatigue or generalised malaise.  Eyes: No itching or discharge from the eyes  ENT: No ear pain. No ear discharge. No nasal congestion. No post nasal drip. No epistaxis. No throat pain. No sore throat. No difficulty swallowing.   CV: No chest pain. No palpitations. No lightheadedness or dizziness.   Resp: +dyspnea/SOB. No orthopnea. + wheezing. + cough. No stridor. + sputum production. No chest pain with respiration.  GI: No nausea. No vomiting. No diarrhea.  MSK: No joint pain or pain in any extremities  Integumentary: No skin lesions. No pedal edema.  Neurological: No gross motor weakness. No sensory changes.    [x ] All other systems negative  [ ] Unable to assess ROS because ________    OBJECTIVE:  Vital Signs Last 24 Hrs  T(C): 37.1 (2025 12:17), Max: 37.1 (2025 12:17)  T(F): 98.7 (2025 12:17), Max: 98.7 (2025 12:17)  HR: 85 (2025 17:30) (78 - 112)  BP: 117/70 (2025 17:30) (117/70 - 142/79)  RR: 20 (2025 17:30) (18 - 32)  SpO2: 95% (2025 17:30) (63% - 100%)    O2 Parameters below as of 2025 17:30  Patient On (Oxygen Delivery Method): BiPAP/CPAP              CAPILLARY BLOOD GLUCOSE  POCT Blood Glucose.: 127 mg/dL (2025 16:59)      PHYSICAL EXAM:  General: Awake, alert, oriented X 3. on BIPAP  HEENT: Atraumatic, normocephalic. PERRL  Neck: No JVD. supple.   Respiratory: Mild wheeze, no rhonchi or rales  Cardiovascular: RRR  Abdomen: Soft, non-tender, non-distended. +BS  Extremities: Warm to touch. Peripheral pulse palpable. No pedal edema.   Skin: No rashes or skin lesions  Neurological: Non-focal  Psychiatry: Appropriate mood and affect.      HOSPITAL MEDICATIONS:  MEDICATIONS  (STANDING):  albuterol/ipratropium for Nebulization 3 milliLiter(s) Nebulizer every 6 hours  amLODIPine   Tablet 10 milliGRAM(s) Oral daily  aspirin  chewable 81 milliGRAM(s) Oral daily  azithromycin  IVPB 500 milliGRAM(s) IV Intermittent daily  cefTRIAXone   IVPB 1000 milliGRAM(s) IV Intermittent every 24 hours  dextrose 5%. 1000 milliLiter(s) (50 mL/Hr) IV Continuous <Continuous>  dextrose 5%. 1000 milliLiter(s) (100 mL/Hr) IV Continuous <Continuous>  dextrose 50% Injectable 25 Gram(s) IV Push once  dextrose 50% Injectable 12.5 Gram(s) IV Push once  dextrose 50% Injectable 25 Gram(s) IV Push once  enoxaparin Injectable 40 milliGRAM(s) SubCutaneous every 24 hours  fluticasone propionate/ salmeterol 250-50 MICROgram(s) Diskus 1 Dose(s) Inhalation two times a day  glucagon  Injectable 1 milliGRAM(s) IntraMuscular once  insulin glargine Injectable (LANTUS) 15 Unit(s) SubCutaneous at bedtime  insulin lispro (ADMELOG) corrective regimen sliding scale   SubCutaneous three times a day before meals  insulin lispro (ADMELOG) corrective regimen sliding scale   SubCutaneous at bedtime  losartan 100 milliGRAM(s) Oral daily  metoprolol succinate  milliGRAM(s) Oral daily  spironolactone 25 milliGRAM(s) Oral daily    MEDICATIONS  (PRN):  acetaminophen     Tablet .. 650 milliGRAM(s) Oral every 6 hours PRN Temp greater or equal to 38C (100.4F), Mild Pain (1 - 3)  aluminum hydroxide/magnesium hydroxide/simethicone Suspension 30 milliLiter(s) Oral every 4 hours PRN Dyspepsia  dextrose Oral Gel 15 Gram(s) Oral once PRN Blood Glucose LESS THAN 70 milliGRAM(s)/deciliter  melatonin 3 milliGRAM(s) Oral at bedtime PRN Insomnia  ondansetron Injectable 4 milliGRAM(s) IV Push every 8 hours PRN Nausea and/or Vomiting      LABS:                        7.5    12.64 )-----------( 315      ( 2025 08:42 )             26.3         149[H]  |  117[H]  |  17  ----------------------------<  101[H]  4.2   |  26  |  0.86    Ca    9.1      2025 08:42    TPro  7.0  /  Alb  3.3  /  TBili  0.5  /  DBili  x   /  AST  44[H]  /  ALT  54  /  AlkPhos  106  06-          Venous Blood Gas:   @ 15:38  7.23/67/33/28/47.2  VBG Lactate: 0.80    Venous Blood Gas:   @ 09:09  7.30/56/43/28/66.4  VBG Lactate: 1.20            RADIOLOGY:    < from: CT Angio Chest PE Protocol w/ IV Cont (25 @ 11:21) >  FINDINGS:    LUNGS AND LARGE AIRWAYS: Patent central airways. Patchy airspace   opacities throughout both lungs, suspicious for pneumonia. Right middle   lobe atelectasis.  PLEURA: No pleural effusion.  VESSELS: No evidence of pulmonary embolism. Coronary artery   calcifications.  HEART: Heart size is enlarged. No pericardial effusion.  MEDIASTINUM AND CHIARA: No lymphadenopathy.  CHEST WALL AND LOWER NECK: Within normal limits.  VISUALIZED UPPER ABDOMEN: Exophytic right upper pole renal cyst.  BONES: Degenerative changes.    IMPRESSION:  No evidence of pulmonary embolism. Multifocal pneumonia.

## 2025-06-02 NOTE — CONSULT NOTE ADULT - NS_MD_PANP_GEN_ALL_CORE
Artificial Intelligence Notification  [facility]  [address]  [place] [address]  Phone: [phone]    This documentation was triggered by an Artificial Intelligence Notification:    Admit Date: 4/3/2025   LOS: 5  Code Status: Full Code  : 1939  Age: 85 y.o.  Weight:   Wt Readings from Last 1 Encounters:   25 88 kg (194 lb 0.1 oz)        Sex: male  Bed: 860/0 A  MRN: [id number]  Attending Physician: Itzel Webb,*     Date of Alert: 2025  Time AI Alert Received:             Vitals:    25   BP:    Pulse:    Resp: (!) 28   Temp:      SpO2: 95 %      Artificial Intelligence alert discussed with Provider:     Name: MATTHEW Chun   Date/Time of Provider Notification:2025      Patient Condition:Spoke to pt's nurse Adiel. Pt bp cycled 66/38. Bp recheck a min later, 94/56, map 69. Per pt's nurse, pt not currently in any distress. RR 36, then 28. Nurse reports pt is resting comfortably, satting 95% on RA. Not in any respiratory distress. Pt has history of SOB d/t recurrent pleural effusions. Recently diagnosed w/ adenocarcinoma, likey lung. Plur-x cath placed today with Dr. Hull w/ CV surgery. Adiel will recheck a bp @ . Instructed ICU available as needed.   
Attending and PA/NP shared services statement (NON-critical care):

## 2025-06-02 NOTE — ED PROVIDER NOTE - WR ORDER STATUS 1
Visit Information Date & Time Provider Department Dept. Phone Encounter #  
 4/19/2017  7:30 AM Linda Salomon MD [facility] - [facility] [phone] [id number] Upcoming Health Maintenance Date Due  
 BREAST CANCER SCRN MAMMOGRAM 7/7/2018 COLONOSCOPY 3/18/2021 DTaP/Tdap/Td series (2 - Td) 11/10/2024 Allergies as of 4/19/2017  Review Complete On: 4/19/2017 By: Linda Salomon MD  
  
 Severity Noted Reaction Type Reactions Other Plant, Animal, Environmental High 06/05/2014   Systemic Anaphylaxis  
 poison ivy Current Immunizations  Reviewed on 11/10/2014 Name Date Influenza Vaccine 10/12/2016 Tdap 11/10/2014 Not reviewed this visit You Were Diagnosed With   
  
 Codes Comments Primary osteoarthritis of left knee    -  Primary ICD-10-CM: M17.12 
ICD-9-CM: 715.16 History of right hip replacement     ICD-10-CM: N37.119 ICD-9-CM: V43.64 Vitals BP Pulse Temp Weight(growth percentile) BMI OB Status 116/79 (BP 1 Location: Left arm, BP Patient Position: Sitting) 88 98 °F (36.7 °C) (Oral) 175 lb (79.4 kg) 27.41 kg/m2 Hysterectomy Smoking Status Never Smoker BMI and BSA Data Body Mass Index Body Surface Area  
 27.41 kg/m 2 1.94 m 2 Preferred Pharmacy Pharmacy Name Phone [phone] [address] [phone] Your Updated Medication List  
  
   
This list is accurate as of: 4/19/17  8:48 AM.  Always use your most recent med list.  
  
  
  
  
 albuterol 90 mcg/actuation inhaler Commonly known as:  PROVENTIL HFA, VENTOLIN HFA, PROAIR HFA Take 2 Puffs by inhalation every four (4) hours as needed for Wheezing. amoxicillin 500 mg capsule Commonly known as:  AMOXIL Take 4 capsules one hour prior to appointment  
  
 aspirin 325 mg tablet Commonly known as:  ASPIRIN Take 1 Tab by mouth two (2) times a day. estradiol 1 mg tablet Commonly known as:  ESTRACE Take 1 mg by mouth every seven (7) days. Indications: Patient takes half tab daily  
  
 ferrous sulfate 325 mg (65 mg iron) tablet Take 1 Tab by mouth two (2) times daily (with meals). HYDROcodone-acetaminophen 7.5-325 mg per tablet Commonly known as:  Karole Dakins Take 1-2 Tabs by mouth every eight (8) hours as needed for Pain. Max Daily Amount: 6 Tabs. methylPREDNISolone 4 mg tablet Commonly known as:  MEDROL (CATHY) Per dose pack instructions * MOBIC 15 mg tablet Generic drug:  meloxicam  
Take 15 mg by mouth daily. substituted for celebrex * meloxicam 15 mg tablet Commonly known as:  MOBIC Take 1 Tab by mouth daily. ondansetron hcl 4 mg tablet Commonly known as:  ZOFRAN (AS HYDROCHLORIDE) Take 1 Tab by mouth every eight (8) hours as needed for Nausea. oxyCODONE-acetaminophen  mg per tablet Commonly known as:  PERCOCET 10 Take 1-2 Tabs by mouth every six (6) hours as needed. Max Daily Amount: 8 Tabs. promethazine 25 mg tablet Commonly known as:  PHENERGAN Take 1 Tab by mouth every six (6) hours as needed for Nausea. venlafaxine-SR 75 mg capsule Commonly known as:  EFFEXOR-XR Take 37.5 mg by mouth daily. XANAX 0.25 mg tablet Generic drug:  ALPRAZolam  
Take 0.25 mg by mouth nightly as needed. PRN sleep  
  
 zolpidem 5 mg tablet Commonly known as:  AMBIEN Take 1 Tab by mouth nightly as needed for Sleep. * Notice: This list has 2 medication(s) that are the same as other medications prescribed for you. Read the directions carefully, and ask your doctor or other care provider to review them with you. We Performed the Following AMB POC X-RAY RADEX HIP UNILATERAL WITH PELVIS 1 VIEW [53740 CPT(R)] AMB POC XRAY, KNEE; COMPLETE, 4+ VIEW [01591 CPT(R)] REFERRAL TO PHYSICAL THERAPY [NEH Custom] Comments:  
 Eval and treat right hip 
2-3x/week 6 weeks Total hip protocol Side-lying abduction- work up to 5# Iontophoresis, CHELSEY stretching, Ultrasound, gentle strengthening & gait training To-Do List   
 04/28/2017 Imaging:  US EXT NONVAS LT COMP Referral Information Referral ID Referred By Referred To  
  
 [id number] SANYA SCHAEFER [facility]   
   [place]  A [address] Phone: [phone] Fax: [phone] Visits Status Start Date End Date 1 New Request 4/19/17 4/19/18 If your referral has a status of pending review or denied, additional information will be sent to support the outcome of this decision. Patient Instructions You should follow up in 3-4 weeks. If your condition worsens, contact our office. Hip Bursitis: Exercises Your Care Instructions Here are some examples of typical rehabilitation exercises for your condition. Start each exercise slowly. Ease off the exercise if you start to have pain. Your doctor or physical therapist will tell you when you can start these exercises and which ones will work best for you. How to do the exercises Hip rotator stretch 1. Lie on your back with both knees bent and your feet flat on the floor. 2. Put the ankle of your affected leg on your opposite thigh near your knee. 3. Use your hand to gently push your knee away from your body until you feel a gentle stretch around your hip. 4. Hold the stretch for 15 to 30 seconds. 5. Repeat 2 to 4 times. 6. Repeat steps 1 through 5, but this time use your hand to gently pull your knee toward your opposite shoulder. Iliotibial band stretch 1. Lean sideways against a wall. If you are not steady on your feet, hold on to a chair or counter. 2. Stand on the leg with the affected hip, with that leg close to the wall. Then cross your other leg in front of it.  
3. Let your affected hip drop out to the side of your body and against wall. Then lean away from your affected hip until you feel a stretch. 4. Hold the stretch for 15 to 30 seconds. 5. Repeat 2 to 4 times. Straight-leg raises to the outside 1. Lie on your side, with your affected hip on top. 2. Tighten the front thigh muscles of your top leg to keep your knee straight. 3. Keep your hip and your leg straight in line with the rest of your body, and keep your knee pointing forward. Do not drop your hip back. 4. Lift your top leg straight up toward the ceiling, about 12 inches off the floor. Hold for about 6 seconds, then slowly lower your leg. 5. Repeat 8 to 12 times. Clamshell 1. Lie on your side, with your affected hip on top and your head propped on a pillow. Keep your feet and knees together and your knees bent. 2. Raise your top knee, but keep your feet together. Do not let your hips roll back. Your legs should open up like a clamshell. 3. Hold for 6 seconds. 4. Slowly lower your knee back down. Rest for 10 seconds. 5. Repeat 8 to 12 times. Follow-up care is a key part of your treatment and safety. Be sure to make and go to all appointments, and call your doctor if you are having problems. It's also a good idea to know your test results and keep a list of the medicines you take. Where can you learn more? Go to http://sarahi-musa.info/. Enter Y375 in the search box to learn more about \"Hip Bursitis: Exercises. \" Current as of: May 23, 2016 Content Version: 11.2 © 3786-9369 [employer]. Care instructions adapted under license by [employer] (which disclaims liability or warranty for this information). If you have questions about a medical condition or this instruction, always ask your healthcare professional. [address] any warranty or liability for your use of this information. Introducing [facility]! Dear Sandra Espinal: Thank you for requesting a [employer] account. Our records indicate that you have previously registered for a [employer] account but its currently inactive. Please call our [employer] support line at [phone]. Additional Information If you have questions, please visit the Frequently Asked Questions section of the [employer] website at [email or web address] Remember, [employer] is NOT to be used for urgent needs. For medical emergencies, dial 911. Now available from your iPhone and Android! Please provide this summary of care documentation to your next provider. Your primary care clinician is listed as Steph Triana. If you have any questions after today's visit, please call [phone]. Performed

## 2025-06-02 NOTE — ED ADULT TRIAGE NOTE - CHIEF COMPLAINT QUOTE
BIBEMS c/o difficulty breathing and cough since last night. Pt tachypneic at triage. On 4L nc at baseline. PMH COPD, Asthma, HTN, DM.

## 2025-06-02 NOTE — H&P ADULT - NSHPPHYSICALEXAM_GEN_ALL_CORE
GENERAL: NAD  HEAD:  Atraumatic, Normocephalic, mild resp distress, on bipap   EYES: EOMI, sclera anicteric  ENMT: Moist mucous membranes  NECK: Supple, No JVD  NERVOUS SYSTEM:  Alert & Oriented, No FND appreciated   CHEST/LUNG: CTAB, No rales, rhonchi, wheezing, or rubs  HEART: RRR; No murmurs, rubs, or gallops  ABDOMEN: Soft, Nontender, obese abd  EXTREMITIES:  2+ Peripheral Pulses, No clubbing, cyanosis, or edema   SKIN: No rashes or lesions  PSYCH: Mood appropriate GENERAL: NAD  HEAD:  Atraumatic, Normocephalic, mild resp distress, on bipap   EYES: EOMI, sclera anicteric  ENMT: Moist mucous membranes  NECK: Supple, No JVD  NERVOUS SYSTEM:  Alert & Oriented, No FND appreciated   CHEST/LUNG: Coarse breath sounds bilaterally   HEART: RRR; No murmurs, rubs, or gallops  ABDOMEN: Soft, Nontender, obese abd  EXTREMITIES:  2+ Peripheral Pulses, No clubbing, cyanosis, or edema   SKIN: No rashes or lesions  PSYCH: Mood appropriate

## 2025-06-02 NOTE — H&P ADULT - ASSESSMENT
This is a 59 year old F with PMH HTN, DM, COPD/asthma presenting with shortness of breath and cough x 1 day admitted for acute hypoxic respiratory failure with multifocal pneumonia.     Acute hypercarbic hypoxic respiratory failure   Community acquire multifocal pneumonia   Asthma exacerbation   Hypoxia 2/2 multifocal pneumonia likely leading to asthma exacerbation   CTA negative to PE, revealed multifocal pneumonia   Received IV steroids, duonebs, CTX in ED  SpO2 80s on room air, initially on 4L; VBG 7.3/56/43/28, placed on bipap   MRSA PCR, Strep/legionella urine antigen, Mycoplasma IgM, sputum cx ordered   C/w Duonebs standing  IV solumedrol 40mg daily, wean as clinically warranted   C/w Advair BID   CTX and azithromycin for CAP   Pulm consulted    Asthma   See above     HTN  C/w home meds: metoprolol succ, amlodipine, losartan   Adjust as needed    DM2  A1C ordered   Per EMR, takes lantus 30units at bedtime, will start with Lantus 15 units HS and titrate as indicated   ISS, Accuchecks, Hypoglycemic protocol     Normocytic anemia   No signs of bleeding on exam   Iron panel, ferritin, b12, folate levels ordered   Transfuse of hgb < 7     HLD   Mild elevation is AST, will hold statin    Obesity   Lifestyle modifications     DVT ppx  lovenox     Med rec from [employer]   This is a 59 year old F with PMH HTN, DM, COPD/asthma presenting with shortness of breath and cough x 1 day admitted for acute hypoxic respiratory failure with multifocal pneumonia.     Acute hypercarbic hypoxic respiratory failure   Community acquire multifocal pneumonia   Asthma exacerbation   Hypoxia 2/2 multifocal pneumonia likely leading to asthma exacerbation   CTA negative to PE, revealed multifocal pneumonia   Received IV steroids, duonebs, CTX in ED  SpO2 80s on room air, initially on 4L; VBG 7.3/56/43/28, placed on bipap   MRSA PCR, Strep/legionella urine antigen, Mycoplasma IgM, sputum cx ordered   COVID/Flu/RSV ordered  C/w Duonebs standing  IV solumedrol 40mg daily, wean as clinically warranted   C/w Advair BID   CTX and azithromycin for CAP   Pulm consulted    Asthma   See above     HTN  C/w home meds: metoprolol succ, amlodipine, losartan   Adjust as needed    DM2  A1C ordered   Per EMR, takes lantus 30units at bedtime, will start with Lantus 15 units HS and titrate as indicated   ISS, Accuchecks, Hypoglycemic protocol     Normocytic anemia   No signs of bleeding on exam   Iron panel, ferritin, b12, folate levels ordered   Transfuse of hgb < 7     HLD   Mild elevation is AST, will hold statin    Obesity   Lifestyle modifications     DVT ppx  lovenox     Med rec from [employer]

## 2025-06-02 NOTE — H&P ADULT - NSHPLABSRESULTS_GEN_ALL_CORE
7.5    12.64 )-----------( 315      ( 02 Jun 2025 08:42 )             26.3     06-02    149[H]  |  117[H]  |  17  ----------------------------<  101[H]  4.2   |  26  |  0.86    Ca    9.1      02 Jun 2025 08:42    TPro  7.0  /  Alb  3.3  /  TBili  0.5  /  DBili  x   /  AST  44[H]  /  ALT  54  /  AlkPhos  106  06-02      Urinalysis Basic - ( 02 Jun 2025 08:42 )    Color: x / Appearance: x / SG: x / pH: x  Gluc: 101 mg/dL / Ketone: x  / Bili: x / Urobili: x   Blood: x / Protein: x / Nitrite: x   Leuk Esterase: x / RBC: x / WBC x   Sq Epi: x / Non Sq Epi: x / Bacteria: x      < from: CT Angio Chest PE Protocol w/ IV Cont (06.02.25 @ 11:21) >    FINDINGS:    LUNGS AND LARGE AIRWAYS: Patent central airways. Patchy airspace   opacities throughout both lungs, suspicious for pneumonia. Right middle   lobe atelectasis.  PLEURA: No pleural effusion.  VESSELS: No evidence of pulmonary embolism. Coronary artery   calcifications.  HEART: Heart size is enlarged. No pericardial effusion.  MEDIASTINUM AND CHIARA: No lymphadenopathy.  CHEST WALL AND LOWER NECK: Within normal limits.  VISUALIZED UPPER ABDOMEN: Exophytic right upper pole renal cyst.  BONES: Degenerative changes.    IMPRESSION:  No evidence of pulmonary embolism. Multifocal pneumonia.    < end of copied text >

## 2025-06-02 NOTE — CONSULT NOTE ADULT - ASSESSMENT
This is a 59 year old F with PMH HTN, DM, COPD/asthma presenting with shortness of breath and cough which started yesterday. CT Chest with Multifocal PNA. Started on BIPAP 2/2 respiratory acidosis. Pulmonary consulted.    DX: Acute on chronic hypoxemic/hypercapnic respiratory failure, Multifocal PNA.     Reccs:    - Acute on chronic hypoxemic/hypercapnic respiratory failure likely multifocal PNA with concurrent COPD exacerbation.  - Currently on BIPAP with worsening hypercapnia. Patient awake and alert with Spo2 stable, adjustments made to NIV settings will obtain repeat ABG now.   - Is on 2 L NC PRN outpatient. Wean off BIPAP/ supplemental O2 as tolerates for goal 02>90%.   - C/W CAP coverage  - F/U Blood cultures, sputum culture, Strep, Legionella, Mycoplasma  - Can continue with  This is a 59 year old F with PMH HTN, DM, COPD/asthma presenting with shortness of breath and cough which started yesterday. CT Chest with Multifocal PNA. Started on BIPAP 2/2 respiratory acidosis. Pulmonary consulted.    DX: Acute on chronic hypoxemic/hypercapnic respiratory failure, Multifocal PNA.     Reccs:    - Acute on chronic hypoxemic/hypercapnic respiratory failure likely multifocal PNA with concurrent COPD exacerbation.  - Currently on BIPAP with worsening hypercapnia. Patient awake and alert with Spo2 stable, adjustments made to NIV settings will obtain repeat ABG now.   - Is on 2 L NC PRN outpatient. Wean off BIPAP/ supplemental O2 as tolerates for goal 02>90%.   - C/W CAP coverage  - F/U Blood cultures, sputum culture, Strep, Legionella, Mycoplasma  - F/U viral panel  - Can continue with solumedrol 40mg IVP qd for now. Aim for 5 day course of steroids but titrate base on clinical course.  - C/W advair, Standing Duonebs  - Rest of care as per primary team    Discussed with Dr. Salinas.

## 2025-06-02 NOTE — ED PROVIDER NOTE - CLINICAL SUMMARY MEDICAL DECISION MAKING FREE TEXT BOX
60 y/o M with PMH HTN, DM, COPD/asthma, here today with difficulty breathing and cough. Patient normally on 4L NC at baseline. Reports her symptoms began last night. She denies fever, chills, CP, N/V/D. She tried using nebulizer treatments at home without relief. Per EMS, she was saturating in the 80s upon their arrival. Their O2 tank stopped working in route.    In the ED, she is tachypneic. Placed on BIPAP with relief of symptoms.    GENERAL: Awake, alert, NAD  HEENT: NC/AT, moist mucous membranes  LUNGS: mild diffuse end expiratory wheeze  CARDIAC: tachycardic, regular rhythm, no m/r/g  ABDOMEN: Soft, non distended, no rebound, no guarding  BACK: No midline spinal tenderness, no CVA tenderness  EXT: No edema, no calf tenderness, no deformities.  NEURO: A&Ox3. Moving all extremities.  SKIN: Warm and dry. No rash.  PSYCH: Normal affect.    Exam as above.    Concern for COPD exacerbation. WIll dose nebs/steroids  Will check labs/CXR/EKG  Plan for admission    Patient tolerating BIPAP well  CT chest + for multifocal PNA  Antibiotics dosed  TBA

## 2025-06-02 NOTE — ED ADULT NURSE NOTE - OBJECTIVE STATEMENT
received by covering rn at this time patient  as per rn era arrived Respiratory distress started yesterday on bipap at this time PMH COPD

## 2025-06-02 NOTE — PATIENT PROFILE ADULT - FALL HARM RISK - HARM RISK INTERVENTIONS

## 2025-06-03 LAB
A1C WITH ESTIMATED AVERAGE GLUCOSE RESULT: 6.7 % — HIGH (ref 4–5.6)
ABO RH CONFIRMATION: SIGNIFICANT CHANGE UP
ANION GAP SERPL CALC-SCNC: 4 MMOL/L — LOW (ref 5–17)
BLD GP AB SCN SERPL QL: SIGNIFICANT CHANGE UP
BUN SERPL-MCNC: 16 MG/DL — SIGNIFICANT CHANGE UP (ref 7–23)
CALCIUM SERPL-MCNC: 8.7 MG/DL — SIGNIFICANT CHANGE UP (ref 8.5–10.1)
CHLORIDE SERPL-SCNC: 116 MMOL/L — HIGH (ref 96–108)
CO2 SERPL-SCNC: 29 MMOL/L — SIGNIFICANT CHANGE UP (ref 22–31)
CREAT SERPL-MCNC: 0.66 MG/DL — SIGNIFICANT CHANGE UP (ref 0.5–1.3)
EGFR: 100 ML/MIN/1.73M2 — SIGNIFICANT CHANGE UP
EGFR: 100 ML/MIN/1.73M2 — SIGNIFICANT CHANGE UP
ESTIMATED AVERAGE GLUCOSE: 146 MG/DL — HIGH (ref 68–114)
FERRITIN SERPL-MCNC: 19 NG/ML — SIGNIFICANT CHANGE UP (ref 13–330)
FOLATE SERPL-MCNC: >20 NG/ML — SIGNIFICANT CHANGE UP
GLUCOSE BLDC GLUCOMTR-MCNC: 216 MG/DL — HIGH (ref 70–99)
GLUCOSE BLDC GLUCOMTR-MCNC: 340 MG/DL — HIGH (ref 70–99)
GLUCOSE BLDC GLUCOMTR-MCNC: 380 MG/DL — HIGH (ref 70–99)
GLUCOSE BLDC GLUCOMTR-MCNC: 78 MG/DL — SIGNIFICANT CHANGE UP (ref 70–99)
GLUCOSE BLDC GLUCOMTR-MCNC: 94 MG/DL — SIGNIFICANT CHANGE UP (ref 70–99)
GLUCOSE SERPL-MCNC: 68 MG/DL — LOW (ref 70–99)
GRAM STN FLD: ABNORMAL
HCT VFR BLD CALC: 24.6 % — LOW (ref 34.5–45)
HGB BLD-MCNC: 6.9 G/DL — CRITICAL LOW (ref 11.5–15.5)
IRON SATN MFR SERPL: 12 UG/DL — LOW (ref 30–160)
IRON SATN MFR SERPL: 5 % — LOW (ref 14–50)
MCHC RBC-ENTMCNC: 25.1 PG — LOW (ref 27–34)
MCHC RBC-ENTMCNC: 28 G/DL — LOW (ref 32–36)
MCV RBC AUTO: 89.5 FL — SIGNIFICANT CHANGE UP (ref 80–100)
MRSA PCR RESULT.: SIGNIFICANT CHANGE UP
NRBC BLD AUTO-RTO: 1 /100 WBCS — HIGH (ref 0–0)
PLATELET # BLD AUTO: 284 K/UL — SIGNIFICANT CHANGE UP (ref 150–400)
POTASSIUM SERPL-MCNC: 4.2 MMOL/L — SIGNIFICANT CHANGE UP (ref 3.5–5.3)
POTASSIUM SERPL-SCNC: 4.2 MMOL/L — SIGNIFICANT CHANGE UP (ref 3.5–5.3)
RBC # BLD: 2.75 M/UL — LOW (ref 3.8–5.2)
RBC # FLD: 18.5 % — HIGH (ref 10.3–14.5)
S AUREUS DNA NOSE QL NAA+PROBE: DETECTED
SODIUM SERPL-SCNC: 149 MMOL/L — HIGH (ref 135–145)
SPECIMEN SOURCE: SIGNIFICANT CHANGE UP
TIBC SERPL-MCNC: 263 UG/DL — SIGNIFICANT CHANGE UP (ref 220–430)
UIBC SERPL-MCNC: 251 UG/DL — SIGNIFICANT CHANGE UP (ref 110–370)
VIT B12 SERPL-MCNC: 776 PG/ML — SIGNIFICANT CHANGE UP (ref 232–1245)
WBC # BLD: 7.1 K/UL — SIGNIFICANT CHANGE UP (ref 3.8–10.5)
WBC # FLD AUTO: 7.1 K/UL — SIGNIFICANT CHANGE UP (ref 3.8–10.5)

## 2025-06-03 PROCEDURE — 93010 ELECTROCARDIOGRAM REPORT: CPT

## 2025-06-03 PROCEDURE — 99233 SBSQ HOSP IP/OBS HIGH 50: CPT

## 2025-06-03 RX ORDER — FERROUS SULFATE 137(45) MG
325 TABLET, EXTENDED RELEASE ORAL DAILY
Refills: 0 | Status: DISCONTINUED | OUTPATIENT
Start: 2025-06-03 | End: 2025-06-06

## 2025-06-03 RX ADMIN — ENOXAPARIN SODIUM 40 MILLIGRAM(S): 100 INJECTION SUBCUTANEOUS at 11:52

## 2025-06-03 RX ADMIN — AMLODIPINE BESYLATE 10 MILLIGRAM(S): 10 TABLET ORAL at 06:45

## 2025-06-03 RX ADMIN — Medication 1 DOSE(S): at 06:56

## 2025-06-03 RX ADMIN — IPRATROPIUM BROMIDE AND ALBUTEROL SULFATE 3 MILLILITER(S): .5; 2.5 SOLUTION RESPIRATORY (INHALATION) at 23:41

## 2025-06-03 RX ADMIN — LOSARTAN POTASSIUM 100 MILLIGRAM(S): 100 TABLET, FILM COATED ORAL at 06:44

## 2025-06-03 RX ADMIN — INSULIN GLARGINE-YFGN 15 UNIT(S): 100 INJECTION, SOLUTION SUBCUTANEOUS at 22:17

## 2025-06-03 RX ADMIN — INSULIN LISPRO 3: 100 INJECTION, SOLUTION INTRAVENOUS; SUBCUTANEOUS at 22:17

## 2025-06-03 RX ADMIN — Medication 25 MILLIGRAM(S): at 06:45

## 2025-06-03 RX ADMIN — IPRATROPIUM BROMIDE AND ALBUTEROL SULFATE 3 MILLILITER(S): .5; 2.5 SOLUTION RESPIRATORY (INHALATION) at 11:09

## 2025-06-03 RX ADMIN — IPRATROPIUM BROMIDE AND ALBUTEROL SULFATE 3 MILLILITER(S): .5; 2.5 SOLUTION RESPIRATORY (INHALATION) at 05:48

## 2025-06-03 RX ADMIN — INSULIN LISPRO 2: 100 INJECTION, SOLUTION INTRAVENOUS; SUBCUTANEOUS at 11:53

## 2025-06-03 RX ADMIN — Medication 81 MILLIGRAM(S): at 11:52

## 2025-06-03 RX ADMIN — Medication 325 MILLIGRAM(S): at 17:12

## 2025-06-03 RX ADMIN — INSULIN LISPRO 4: 100 INJECTION, SOLUTION INTRAVENOUS; SUBCUTANEOUS at 17:13

## 2025-06-03 RX ADMIN — CEFTRIAXONE 100 MILLIGRAM(S): 500 INJECTION, POWDER, FOR SOLUTION INTRAMUSCULAR; INTRAVENOUS at 11:52

## 2025-06-03 RX ADMIN — Medication 1 DOSE(S): at 17:15

## 2025-06-03 RX ADMIN — IPRATROPIUM BROMIDE AND ALBUTEROL SULFATE 3 MILLILITER(S): .5; 2.5 SOLUTION RESPIRATORY (INHALATION) at 17:38

## 2025-06-03 RX ADMIN — IPRATROPIUM BROMIDE AND ALBUTEROL SULFATE 3 MILLILITER(S): .5; 2.5 SOLUTION RESPIRATORY (INHALATION) at 00:50

## 2025-06-03 RX ADMIN — METOPROLOL SUCCINATE 100 MILLIGRAM(S): 50 TABLET, EXTENDED RELEASE ORAL at 06:45

## 2025-06-03 RX ADMIN — METHYLPREDNISOLONE ACETATE 40 MILLIGRAM(S): 80 INJECTION, SUSPENSION INTRA-ARTICULAR; INTRALESIONAL; INTRAMUSCULAR; SOFT TISSUE at 06:44

## 2025-06-03 NOTE — CHART NOTE - NSCHARTNOTEFT_GEN_A_CORE
6.9    7.10  )-----------( 284      ( 03 Jun 2025 06:15 )             24.6   discussed transfusion with patient she is in agreement risks benefits explained allergic reaction major /minor trali, hiv hep b

## 2025-06-03 NOTE — PROGRESS NOTE ADULT - ASSESSMENT
This is a 59 year old F with PMH HTN, DM, COPD/asthma presenting with shortness of breath and cough x 1 day admitted for acute hypoxic respiratory failure with multifocal pneumonia.     Acute hypercarbic hypoxic respiratory failure   Community acquire multifocal pneumonia   Asthma/COPD exacerbation   Hypoxia 2/2 multifocal pneumonia with asthma/COPD exacerbation   CTA negative to PE, revealed multifocal pneumonia   Received IV steroids, duonebs, CTX in ED  SpO2 80s on room air, initially on 4L; VBG 7.3/56/43/28, placed on bipap   Transitioned to 4L NC, wean as tolerated to maintain spO2 >90%  MRSA neg, Strep/legionella urine antigen pending, Mycoplasma IgM pending, sputum cx collected  COVID/Flu/RSV neg  C/w Duonebs standing  IV solumedrol 40mg daily, wean as clinically warranted   C/w Advair BID   CTX and azithromycin for CAP   Pulm following, appreciate recs     Asthma/COPD  See above     HTN  C/w home meds: metoprolol succ, amlodipine, losartan   Adjust as needed    DM2  A1C 6.7%  Per EMR, takes lantus 30units at bedtime, will start with Lantus 15 units HS and titrate as indicated   ISS, Accuchecks, Hypoglycemic protocol     Iron deficiency anemia   No signs of bleeding on exam   Iron panel, ferritin, b12, folate levels noted  Hgb 6.9 today, transfuse 1 unit PRBCs  Start ferrous sulfate   Transfuse of hgb < 7   FOBT ordered     HLD   Mild elevation is AST, will hold statin    Obesity   Lifestyle modifications     DVT ppx  lovenox     Med rec from [facility]

## 2025-06-03 NOTE — PROGRESS NOTE ADULT - ASSESSMENT
This is a 59 year old F with PMH HTN, DM, COPD/asthma presenting with shortness of breath and cough which started yesterday. CT Chest with Multifocal PNA. Started on BIPAP 2/2 respiratory acidosis. Pulmonary consulted.    DX: Acute on chronic hypoxemic/hypercapnic respiratory failure, Multifocal PNA.     Reccs:    - Acute on chronic hypoxemic/hypercapnic respiratory failure likely multifocal PNA with concurrent COPD exacerbation.  - Currently on BIPAP with worsening hypercapnia. Patient awake and alert with Spo2 stable, adjustments made to NIV settings will obtain repeat ABG now.   - Is on 2 L NC PRN outpatient. Wean off BIPAP/ supplemental O2 as tolerates for goal 02>90%.   - C/W CAP coverage  - F/U Blood cultures, sputum culture, Strep, Legionella, Mycoplasma  - F/U viral panel  - Can continue with solumedrol 40mg IVP qd for now. Aim for 5 day course of steroids but titrate base on clinical course.  - C/W advair, Standing Duonebs  - Rest of care as per primary team    Discussed with Dr. Salinas.    This is a 59 year old F with PMH HTN, DM, COPD/asthma presenting with shortness of breath and cough which started yesterday. CT Chest with Multifocal PNA. Started on BIPAP 2/2 respiratory acidosis. Pulmonary consulted.    DX: Acute on chronic hypoxemic/hypercapnic respiratory failure, Multifocal PNA.     Reccs:  - Acute on chronic hypoxemic/hypercapnic respiratory failure likely multifocal PNA with concurrent COPD exacerbation.  - Currently on BIPAP with worsening hypercapnia. Patient awake and alert with Spo2 stable, adjustments made to NIV settings will obtain repeat ABG now.   - Is on 2 L NC PRN outpatient. Wean off BIPAP/ supplemental O2 as tolerates for goal 02>90%.   - continue bipap at night   - C/W CAP coverage  - F/U Blood cultures, sputum culture, Strep, Legionella, Mycoplasma  - Can continue with solumedrol 40mg IVP qd for now. Aim for 5 day course of steroids but titrate base on clinical course.  - C/W advair, Standing Duonebs  - Rest of care as per primary team    Discussed with Dr. Salinas.

## 2025-06-03 NOTE — PROGRESS NOTE ADULT - SUBJECTIVE AND OBJECTIVE BOX
Patient is a 59y old  Female who presents with a chief complaint of Hypoxia (03 Jun 2025 09:53)      INTERVAL HPI/OVERNIGHT EVENTS: Overnight no acute events. Transitioned off BIPAP to 4L NC, saturating well. Hgb 6.9 this AM.     MEDICATIONS  (STANDING):  albuterol/ipratropium for Nebulization 3 milliLiter(s) Nebulizer every 6 hours  amLODIPine   Tablet 10 milliGRAM(s) Oral daily  aspirin  chewable 81 milliGRAM(s) Oral daily  azithromycin  IVPB 500 milliGRAM(s) IV Intermittent daily  cefTRIAXone   IVPB 1000 milliGRAM(s) IV Intermittent every 24 hours  dextrose 5%. 1000 milliLiter(s) (50 mL/Hr) IV Continuous <Continuous>  dextrose 5%. 1000 milliLiter(s) (100 mL/Hr) IV Continuous <Continuous>  dextrose 50% Injectable 25 Gram(s) IV Push once  dextrose 50% Injectable 12.5 Gram(s) IV Push once  dextrose 50% Injectable 25 Gram(s) IV Push once  enoxaparin Injectable 40 milliGRAM(s) SubCutaneous every 24 hours  ferrous    sulfate 325 milliGRAM(s) Oral daily  fluticasone propionate/ salmeterol 250-50 MICROgram(s) Diskus 1 Dose(s) Inhalation two times a day  glucagon  Injectable 1 milliGRAM(s) IntraMuscular once  insulin glargine Injectable (LANTUS) 15 Unit(s) SubCutaneous at bedtime  insulin lispro (ADMELOG) corrective regimen sliding scale   SubCutaneous three times a day before meals  insulin lispro (ADMELOG) corrective regimen sliding scale   SubCutaneous at bedtime  losartan 100 milliGRAM(s) Oral daily  methylPREDNISolone sodium succinate Injectable 40 milliGRAM(s) IV Push daily  metoprolol succinate  milliGRAM(s) Oral daily  spironolactone 25 milliGRAM(s) Oral daily    MEDICATIONS  (PRN):  acetaminophen     Tablet .. 650 milliGRAM(s) Oral every 6 hours PRN Temp greater or equal to 38C (100.4F), Mild Pain (1 - 3)  aluminum hydroxide/magnesium hydroxide/simethicone Suspension 30 milliLiter(s) Oral every 4 hours PRN Dyspepsia  dextrose Oral Gel 15 Gram(s) Oral once PRN Blood Glucose LESS THAN 70 milliGRAM(s)/deciliter  melatonin 3 milliGRAM(s) Oral at bedtime PRN Insomnia  ondansetron Injectable 4 milliGRAM(s) IV Push every 8 hours PRN Nausea and/or Vomiting      Allergies    No Known Allergies    Intolerances        REVIEW OF SYSTEMS:  ROS negative unless stated otherwise.    Vital Signs Last 24 Hrs  T(C): 36.6 (03 Jun 2025 10:28), Max: 36.7 (02 Jun 2025 19:53)  T(F): 97.9 (03 Jun 2025 10:28), Max: 98 (02 Jun 2025 19:53)  HR: 80 (03 Jun 2025 11:09) (78 - 95)  BP: 113/76 (03 Jun 2025 10:28) (113/76 - 126/86)  BP(mean): --  RR: 18 (03 Jun 2025 10:28) (14 - 20)  SpO2: 95% (03 Jun 2025 11:09) (94% - 100%)    Parameters below as of 03 Jun 2025 11:09  Patient On (Oxygen Delivery Method): nasal cannula        PHYSICAL EXAM:  GENERAL: NAD on NC  HEAD:  Atraumatic, Normocephalic  EYES: EOMI, sclera anicteric  ENMT: Moist mucous membranes  NECK: Supple, No JVD  NERVOUS SYSTEM:  Alert & Oriented, No FND appreciated   CHEST/LUNG: Coarse breath sounds bilaterally   HEART: RRR; No murmurs, rubs, or gallops  ABDOMEN: Soft, Nontender, obese abd  EXTREMITIES:  2+ Peripheral Pulses, No clubbing, cyanosis, or edema   SKIN: No rashes or lesions  PSYCH: Mood appropriate    LABS:                        6.9    7.10  )-----------( 284      ( 03 Jun 2025 06:15 )             24.6     06-03    149[H]  |  116[H]  |  16  ----------------------------<  68[L]  4.2   |  29  |  0.66    Ca    8.7      03 Jun 2025 06:15    TPro  7.0  /  Alb  3.3  /  TBili  0.5  /  DBili  x   /  AST  44[H]  /  ALT  54  /  AlkPhos  106  06-02      Urinalysis Basic - ( 03 Jun 2025 06:15 )    Color: x / Appearance: x / SG: x / pH: x  Gluc: 68 mg/dL / Ketone: x  / Bili: x / Urobili: x   Blood: x / Protein: x / Nitrite: x   Leuk Esterase: x / RBC: x / WBC x   Sq Epi: x / Non Sq Epi: x / Bacteria: x      CAPILLARY BLOOD GLUCOSE      POCT Blood Glucose.: 216 mg/dL (03 Jun 2025 11:10)  POCT Blood Glucose.: 78 mg/dL (03 Jun 2025 07:58)  POCT Blood Glucose.: 94 mg/dL (03 Jun 2025 00:43)  POCT Blood Glucose.: 102 mg/dL (02 Jun 2025 23:09)  POCT Blood Glucose.: 127 mg/dL (02 Jun 2025 16:59)      RADIOLOGY & ADDITIONAL TESTS:    Imaging Personally Reviewed:  [ X] YES  [ ] NO    Consultant(s) Notes Reviewed:  [ X] YES  [ ] NO    Care Discussed with Consultants/Other Providers [X ] YES  [ ] NO

## 2025-06-03 NOTE — PROGRESS NOTE ADULT - SUBJECTIVE AND OBJECTIVE BOX
Interval Events: NAEO. Remains on 4L NC. requiring blood transfusion today for Hg <7    REVIEW OF SYSTEMS:  Constitutional: [ ] negative [ ] fevers [ ] chills [ ] weight loss [ ] weight gain  HEENT: [ ] negative [ ] dry eyes [ ] eye irritation [ ] postnasal drip [ ] nasal congestion  CV: [ ] negative  [ ] chest pain [ ] orthopnea [ ] palpitations [ ] murmur  Resp: [ ] negative [ ] cough [ ] shortness of breath [ ] dyspnea [ ] wheezing [ ] sputum [ ] hemoptysis  GI: [ ] negative [ ] nausea [ ] vomiting [ ] diarrhea [ ] constipation [ ] abd pain [ ] dysphagia   : [ ] negative [ ] dysuria [ ] nocturia [ ] hematuria [ ] increased urinary frequency  Musculoskeletal: [ ] negative [ ] back pain [ ] myalgias [ ] arthralgias [ ] fracture  Skin: [ ] negative [ ] rash [ ] itch  Neurological: [ ] negative [ ] headache [ ] dizziness [ ] syncope [ ] weakness [ ] numbness  Psychiatric: [ ] negative [ ] anxiety [ ] depression  Endocrine: [ ] negative [ ] diabetes [ ] thyroid problem  Hematologic/Lymphatic: [ ] negative [ ] anemia [ ] bleeding problem  Allergic/Immunologic: [ ] negative [ ] itchy eyes [ ] nasal discharge [ ] hives [ ] angioedema  [x ] All other systems negative  [ ] Unable to assess ROS because ________    OBJECTIVE:  ICU Vital Signs Last 24 Hrs  T(C): 36.5 (03 Jun 2025 05:03), Max: 37.1 (02 Jun 2025 12:17)  T(F): 97.7 (03 Jun 2025 05:03), Max: 98.7 (02 Jun 2025 12:17)  HR: 83 (03 Jun 2025 08:30) (78 - 104)  BP: 120/78 (03 Jun 2025 05:03) (113/76 - 135/88)  BP(mean): --  ABP: --  ABP(mean): --  RR: 18 (03 Jun 2025 05:03) (14 - 30)  SpO2: 95% (03 Jun 2025 08:30) (95% - 100%)    O2 Parameters below as of 03 Jun 2025 08:30  Patient On (Oxygen Delivery Method): nasal cannula  O2 Flow (L/min): 4            CAPILLARY BLOOD GLUCOSE      POCT Blood Glucose.: 78 mg/dL (03 Jun 2025 07:58)      PHYSICAL EXAM:  General: Awake, alert, oriented X 3. on BIPAP  HEENT: Atraumatic, normocephalic. PERRL  Neck: No JVD. supple.   Respiratory: Mild wheeze, no rhonchi or rales  Cardiovascular: RRR  Abdomen: Soft, non-tender, non-distended. +BS  Extremities: Warm to touch. Peripheral pulse palpable. No pedal edema.   Skin: No rashes or skin lesions  Neurological: Non-focal  Psychiatry: Appropriate mood and affect.    HOSPITAL MEDICATIONS:  aspirin  chewable 81 milliGRAM(s) Oral daily  enoxaparin Injectable 40 milliGRAM(s) SubCutaneous every 24 hours    azithromycin  IVPB 500 milliGRAM(s) IV Intermittent daily  cefTRIAXone   IVPB 1000 milliGRAM(s) IV Intermittent every 24 hours    amLODIPine   Tablet 10 milliGRAM(s) Oral daily  losartan 100 milliGRAM(s) Oral daily  metoprolol succinate  milliGRAM(s) Oral daily  spironolactone 25 milliGRAM(s) Oral daily    dextrose 50% Injectable 25 Gram(s) IV Push once  dextrose 50% Injectable 12.5 Gram(s) IV Push once  dextrose 50% Injectable 25 Gram(s) IV Push once  dextrose Oral Gel 15 Gram(s) Oral once PRN  glucagon  Injectable 1 milliGRAM(s) IntraMuscular once  insulin glargine Injectable (LANTUS) 15 Unit(s) SubCutaneous at bedtime  insulin lispro (ADMELOG) corrective regimen sliding scale   SubCutaneous three times a day before meals  insulin lispro (ADMELOG) corrective regimen sliding scale   SubCutaneous at bedtime  methylPREDNISolone sodium succinate Injectable 40 milliGRAM(s) IV Push daily    albuterol/ipratropium for Nebulization 3 milliLiter(s) Nebulizer every 6 hours  fluticasone propionate/ salmeterol 250-50 MICROgram(s) Diskus 1 Dose(s) Inhalation two times a day    acetaminophen     Tablet .. 650 milliGRAM(s) Oral every 6 hours PRN  melatonin 3 milliGRAM(s) Oral at bedtime PRN  ondansetron Injectable 4 milliGRAM(s) IV Push every 8 hours PRN    aluminum hydroxide/magnesium hydroxide/simethicone Suspension 30 milliLiter(s) Oral every 4 hours PRN        dextrose 5%. 1000 milliLiter(s) IV Continuous <Continuous>  dextrose 5%. 1000 milliLiter(s) IV Continuous <Continuous>            LABS:                        6.9    7.10  )-----------( 284      ( 03 Jun 2025 06:15 )             24.6     Hgb Trend: 6.9<--, 7.5<--  06-03    149[H]  |  116[H]  |  16  ----------------------------<  68[L]  4.2   |  29  |  0.66    Ca    8.7      03 Jun 2025 06:15    TPro  7.0  /  Alb  3.3  /  TBili  0.5  /  DBili  x   /  AST  44[H]  /  ALT  54  /  AlkPhos  106  06-02    Creatinine Trend: 0.66<--, 0.86<--    Urinalysis Basic - ( 03 Jun 2025 06:15 )    Color: x / Appearance: x / SG: x / pH: x  Gluc: 68 mg/dL / Ketone: x  / Bili: x / Urobili: x   Blood: x / Protein: x / Nitrite: x   Leuk Esterase: x / RBC: x / WBC x   Sq Epi: x / Non Sq Epi: x / Bacteria: x      Arterial Blood Gas:  06-02 @ 18:22  7.26/60/103/27/98.4/-1.4  ABG lactate: --    Venous Blood Gas:  06-02 @ 15:38  7.23/67/33/28/47.2  VBG Lactate: 0.80  Venous Blood Gas:  06-02 @ 09:09  7.30/56/43/28/66.4  VBG Lactate: 1.20         Interval Events: NAEO. Remains on 4L NC. requiring blood transfusion today for Hg <7    REVIEW OF SYSTEMS:  Constitutional: [ ] negative [ ] fevers [ ] chills [ ] weight loss [ ] weight gain  HEENT: [ ] negative [ ] dry eyes [ ] eye irritation [ ] postnasal drip [ ] nasal congestion  CV: [ ] negative  [ ] chest pain [ ] orthopnea [ ] palpitations [ ] murmur  Resp: [ ] negative [ ] cough [ ] shortness of breath [ ] dyspnea [ ] wheezing [ ] sputum [ ] hemoptysis  GI: [ ] negative [ ] nausea [ ] vomiting [ ] diarrhea [ ] constipation [ ] abd pain [ ] dysphagia   : [ ] negative [ ] dysuria [ ] nocturia [ ] hematuria [ ] increased urinary frequency  Musculoskeletal: [ ] negative [ ] back pain [ ] myalgias [ ] arthralgias [ ] fracture  Skin: [ ] negative [ ] rash [ ] itch  Neurological: [ ] negative [ ] headache [ ] dizziness [ ] syncope [ ] weakness [ ] numbness  Psychiatric: [ ] negative [ ] anxiety [ ] depression  Endocrine: [ ] negative [ ] diabetes [ ] thyroid problem  Hematologic/Lymphatic: [ ] negative [ ] anemia [ ] bleeding problem  Allergic/Immunologic: [ ] negative [ ] itchy eyes [ ] nasal discharge [ ] hives [ ] angioedema  [x ] All other systems negative  [ ] Unable to assess ROS because ________    OBJECTIVE:  ICU Vital Signs Last 24 Hrs  T(C): 36.5 (03 Jun 2025 05:03), Max: 37.1 (02 Jun 2025 12:17)  T(F): 97.7 (03 Jun 2025 05:03), Max: 98.7 (02 Jun 2025 12:17)  HR: 83 (03 Jun 2025 08:30) (78 - 104)  BP: 120/78 (03 Jun 2025 05:03) (113/76 - 135/88)  BP(mean): --  ABP: --  ABP(mean): --  RR: 18 (03 Jun 2025 05:03) (14 - 30)  SpO2: 95% (03 Jun 2025 08:30) (95% - 100%)    O2 Parameters below as of 03 Jun 2025 08:30  Patient On (Oxygen Delivery Method): nasal cannula  O2 Flow (L/min): 4            CAPILLARY BLOOD GLUCOSE      POCT Blood Glucose.: 78 mg/dL (03 Jun 2025 07:58)      PHYSICAL EXAM:  General: Awake, alert, oriented X 3. on BIPAP  HEENT: Atraumatic, normocephalic. PERRL  Neck: No JVD. supple.   Respiratory: no wheezing, rhonchi b/l   Cardiovascular: RRR  Abdomen: Soft, non-tender, non-distended. +BS  Extremities: Warm to touch. Peripheral pulse palpable. No pedal edema.   Skin: No rashes or skin lesions  Neurological: Non-focal  Psychiatry: Appropriate mood and affect.    HOSPITAL MEDICATIONS:  aspirin  chewable 81 milliGRAM(s) Oral daily  enoxaparin Injectable 40 milliGRAM(s) SubCutaneous every 24 hours    azithromycin  IVPB 500 milliGRAM(s) IV Intermittent daily  cefTRIAXone   IVPB 1000 milliGRAM(s) IV Intermittent every 24 hours    amLODIPine   Tablet 10 milliGRAM(s) Oral daily  losartan 100 milliGRAM(s) Oral daily  metoprolol succinate  milliGRAM(s) Oral daily  spironolactone 25 milliGRAM(s) Oral daily    dextrose 50% Injectable 25 Gram(s) IV Push once  dextrose 50% Injectable 12.5 Gram(s) IV Push once  dextrose 50% Injectable 25 Gram(s) IV Push once  dextrose Oral Gel 15 Gram(s) Oral once PRN  glucagon  Injectable 1 milliGRAM(s) IntraMuscular once  insulin glargine Injectable (LANTUS) 15 Unit(s) SubCutaneous at bedtime  insulin lispro (ADMELOG) corrective regimen sliding scale   SubCutaneous three times a day before meals  insulin lispro (ADMELOG) corrective regimen sliding scale   SubCutaneous at bedtime  methylPREDNISolone sodium succinate Injectable 40 milliGRAM(s) IV Push daily    albuterol/ipratropium for Nebulization 3 milliLiter(s) Nebulizer every 6 hours  fluticasone propionate/ salmeterol 250-50 MICROgram(s) Diskus 1 Dose(s) Inhalation two times a day    acetaminophen     Tablet .. 650 milliGRAM(s) Oral every 6 hours PRN  melatonin 3 milliGRAM(s) Oral at bedtime PRN  ondansetron Injectable 4 milliGRAM(s) IV Push every 8 hours PRN    aluminum hydroxide/magnesium hydroxide/simethicone Suspension 30 milliLiter(s) Oral every 4 hours PRN        dextrose 5%. 1000 milliLiter(s) IV Continuous <Continuous>  dextrose 5%. 1000 milliLiter(s) IV Continuous <Continuous>            LABS:                        6.9    7.10  )-----------( 284      ( 03 Jun 2025 06:15 )             24.6     Hgb Trend: 6.9<--, 7.5<--  06-03    149[H]  |  116[H]  |  16  ----------------------------<  68[L]  4.2   |  29  |  0.66    Ca    8.7      03 Jun 2025 06:15    TPro  7.0  /  Alb  3.3  /  TBili  0.5  /  DBili  x   /  AST  44[H]  /  ALT  54  /  AlkPhos  106  06-02    Creatinine Trend: 0.66<--, 0.86<--    Urinalysis Basic - ( 03 Jun 2025 06:15 )    Color: x / Appearance: x / SG: x / pH: x  Gluc: 68 mg/dL / Ketone: x  / Bili: x / Urobili: x   Blood: x / Protein: x / Nitrite: x   Leuk Esterase: x / RBC: x / WBC x   Sq Epi: x / Non Sq Epi: x / Bacteria: x      Arterial Blood Gas:  06-02 @ 18:22  7.26/60/103/27/98.4/-1.4  ABG lactate: --    Venous Blood Gas:  06-02 @ 15:38  7.23/67/33/28/47.2  VBG Lactate: 0.80  Venous Blood Gas:  06-02 @ 09:09  7.30/56/43/28/66.4  VBG Lactate: 1.20         Interval Events: NAEO. Remains on 4L NC. requiring blood transfusion today for Hg <7    REVIEW OF SYSTEMS:  Constitutional: [ ] negative [ ] fevers [ ] chills [ ] weight loss [ ] weight gain  HEENT: [ ] negative [ ] dry eyes [ ] eye irritation [ ] postnasal drip [ ] nasal congestion  CV: [ ] negative  [ ] chest pain [ ] orthopnea [ ] palpitations [ ] murmur  Resp: [ ] negative [ x] cough [ x] shortness of breath [ ] dyspnea [x ] wheezing [ ] sputum [ ] hemoptysis  GI: [ ] negative [ ] nausea [ ] vomiting [ ] diarrhea [ ] constipation [ ] abd pain [ ] dysphagia   : [ ] negative [ ] dysuria [ ] nocturia [ ] hematuria [ ] increased urinary frequency  Musculoskeletal: [ ] negative [ ] back pain [ ] myalgias [ ] arthralgias [ ] fracture  Skin: [ ] negative [ ] rash [ ] itch  Neurological: [ ] negative [ ] headache [ ] dizziness [ ] syncope [ ] weakness [ ] numbness  Psychiatric: [ ] negative [ ] anxiety [ ] depression  Endocrine: [ ] negative [ ] diabetes [ ] thyroid problem  Hematologic/Lymphatic: [ ] negative [ ] anemia [ ] bleeding problem  Allergic/Immunologic: [ ] negative [ ] itchy eyes [ ] nasal discharge [ ] hives [ ] angioedema  [x ] All other systems negative      OBJECTIVE:  Vital Signs Last 24 Hrs  T(C): 36.5 (03 Jun 2025 05:03), Max: 37.1 (02 Jun 2025 12:17)  T(F): 97.7 (03 Jun 2025 05:03), Max: 98.7 (02 Jun 2025 12:17)  HR: 83 (03 Jun 2025 08:30) (78 - 104)  BP: 120/78 (03 Jun 2025 05:03) (113/76 - 135/88)  RR: 18 (03 Jun 2025 05:03) (14 - 30)  SpO2: 95% (03 Jun 2025 08:30) (95% - 100%)    O2 Parameters below as of 03 Jun 2025 08:30  Patient On (Oxygen Delivery Method): nasal cannula  O2 Flow (L/min): 4            CAPILLARY BLOOD GLUCOSE  POCT Blood Glucose.: 78 mg/dL (03 Jun 2025 07:58)      PHYSICAL EXAM:  General: Awake, alert, oriented X 3. on BIPAP  HEENT: Atraumatic, normocephalic. PERRL  Neck: No JVD. supple.   Respiratory: +wheezing with rhonchi b/l   Cardiovascular: RRR  Abdomen: Soft, non-tender, non-distended. +BS  Extremities: Warm to touch. Peripheral pulse palpable. No pedal edema.   Skin: No rashes or skin lesions  Neurological: Non-focal  Psychiatry: Appropriate mood and affect.      HOSPITAL MEDICATIONS:  aspirin  chewable 81 milliGRAM(s) Oral daily  enoxaparin Injectable 40 milliGRAM(s) SubCutaneous every 24 hours    azithromycin  IVPB 500 milliGRAM(s) IV Intermittent daily  cefTRIAXone   IVPB 1000 milliGRAM(s) IV Intermittent every 24 hours    amLODIPine   Tablet 10 milliGRAM(s) Oral daily  losartan 100 milliGRAM(s) Oral daily  metoprolol succinate  milliGRAM(s) Oral daily  spironolactone 25 milliGRAM(s) Oral daily    dextrose 50% Injectable 25 Gram(s) IV Push once  dextrose 50% Injectable 12.5 Gram(s) IV Push once  dextrose 50% Injectable 25 Gram(s) IV Push once  dextrose Oral Gel 15 Gram(s) Oral once PRN  glucagon  Injectable 1 milliGRAM(s) IntraMuscular once  insulin glargine Injectable (LANTUS) 15 Unit(s) SubCutaneous at bedtime  insulin lispro (ADMELOG) corrective regimen sliding scale   SubCutaneous three times a day before meals  insulin lispro (ADMELOG) corrective regimen sliding scale   SubCutaneous at bedtime  methylPREDNISolone sodium succinate Injectable 40 milliGRAM(s) IV Push daily    albuterol/ipratropium for Nebulization 3 milliLiter(s) Nebulizer every 6 hours  fluticasone propionate/ salmeterol 250-50 MICROgram(s) Diskus 1 Dose(s) Inhalation two times a day    acetaminophen     Tablet .. 650 milliGRAM(s) Oral every 6 hours PRN  melatonin 3 milliGRAM(s) Oral at bedtime PRN  ondansetron Injectable 4 milliGRAM(s) IV Push every 8 hours PRN    aluminum hydroxide/magnesium hydroxide/simethicone Suspension 30 milliLiter(s) Oral every 4 hours PRN        dextrose 5%. 1000 milliLiter(s) IV Continuous <Continuous>  dextrose 5%. 1000 milliLiter(s) IV Continuous <Continuous>            LABS:                        6.9    7.10  )-----------( 284      ( 03 Jun 2025 06:15 )             24.6     Hgb Trend: 6.9<--, 7.5<--  06-03    149[H]  |  116[H]  |  16  ----------------------------<  68[L]  4.2   |  29  |  0.66    Ca    8.7      03 Jun 2025 06:15    TPro  7.0  /  Alb  3.3  /  TBili  0.5  /  DBili  x   /  AST  44[H]  /  ALT  54  /  AlkPhos  106  06-02    Creatinine Trend: 0.66<--, 0.86<--        Arterial Blood Gas:  06-02 @ 18:22  7.26/60/103/27/98.4/-1.4  ABG lactate: --    Venous Blood Gas:  06-02 @ 15:38  7.23/67/33/28/47.2  VBG Lactate: 0.80    Venous Blood Gas:  06-02 @ 09:09  7.30/56/43/28/66.4  VBG Lactate: 1.20      FluA/FluB/RSV/COVID PCR (06.02.25 @ 20:30)    SARS-CoV-2 Result: NotDetec: For Emergency Use Authorization   Influenza A Result: NotDetec: For Emergency Use Authorization   Influenza B Result: NotDetec: For Emergency Use Authorization   Resp Syn Virus Result: NotDetec: For Emergency Use Authorization   Source Respiratory: Nasopharyngeal      RADIOLOGY  < from: Xray Chest 1 View- PORTABLE-Urgent (Xray Chest 1 View- PORTABLE-Urgent .) (06.02.25 @ 09:20) >    Single frontal view of the chest demonstrates bilateral infiltrates.   Right hemidiaphragm is mildly elevated. The cardiomediastinal silhouette   is unremarkable. No acute osseous abnormalities. Overlying EKG leads and   wires are noted    IMPRESSION: Bilateral infiltrates. Cardiomegaly.    -------------------  < from: CT Angio Chest PE Protocol w/ IV Cont (06.02.25 @ 11:21) >  LUNGS AND LARGE AIRWAYS: Patent central airways. Patchy airspace   opacities throughout both lungs, suspicious for pneumonia. Right middle   lobe atelectasis.  PLEURA: No pleural effusion.  VESSELS: No evidence of pulmonary embolism. Coronary artery   calcifications.  HEART: Heart size is enlarged. No pericardial effusion.  MEDIASTINUM AND CHIARA: No lymphadenopathy.  CHEST WALL AND LOWER NECK: Within normal limits.  VISUALIZED UPPER ABDOMEN: Exophytic right upper pole renal cyst.  BONES: Degenerative changes.    IMPRESSION:  No evidence of pulmonary embolism. Multifocal pneumonia.

## 2025-06-04 LAB
ANION GAP SERPL CALC-SCNC: 5 MMOL/L — SIGNIFICANT CHANGE UP (ref 5–17)
BASE EXCESS BLDV CALC-SCNC: 0.5 MMOL/L — SIGNIFICANT CHANGE UP (ref -2–3)
BLOOD GAS COMMENTS, VENOUS: SIGNIFICANT CHANGE UP
BUN SERPL-MCNC: 29 MG/DL — HIGH (ref 7–23)
CALCIUM SERPL-MCNC: 8.3 MG/DL — LOW (ref 8.5–10.1)
CHLORIDE SERPL-SCNC: 112 MMOL/L — HIGH (ref 96–108)
CO2 BLDV-SCNC: 31 MMOL/L — HIGH (ref 22–26)
CO2 SERPL-SCNC: 28 MMOL/L — SIGNIFICANT CHANGE UP (ref 22–31)
CREAT SERPL-MCNC: 1.34 MG/DL — HIGH (ref 0.5–1.3)
CULTURE RESULTS: ABNORMAL
EGFR: 45 ML/MIN/1.73M2 — LOW
EGFR: 45 ML/MIN/1.73M2 — LOW
GAS PNL BLDV: SIGNIFICANT CHANGE UP
GLUCOSE BLDC GLUCOMTR-MCNC: 292 MG/DL — HIGH (ref 70–99)
GLUCOSE BLDC GLUCOMTR-MCNC: 295 MG/DL — HIGH (ref 70–99)
GLUCOSE BLDC GLUCOMTR-MCNC: 352 MG/DL — HIGH (ref 70–99)
GLUCOSE BLDC GLUCOMTR-MCNC: 375 MG/DL — HIGH (ref 70–99)
GLUCOSE BLDC GLUCOMTR-MCNC: 402 MG/DL — HIGH (ref 70–99)
GLUCOSE SERPL-MCNC: 256 MG/DL — HIGH (ref 70–99)
GRAM STN FLD: ABNORMAL
HCO3 BLDV-SCNC: 29 MMOL/L — HIGH (ref 22–28)
HCT VFR BLD CALC: 27.5 % — LOW (ref 34.5–45)
HGB BLD-MCNC: 7.7 G/DL — LOW (ref 11.5–15.5)
LEGIONELLA AG UR QL: NEGATIVE — SIGNIFICANT CHANGE UP
M PNEUMO IGM SER-ACNC: 0.48 INDEX — SIGNIFICANT CHANGE UP (ref 0–0.9)
MCHC RBC-ENTMCNC: 25 PG — LOW (ref 27–34)
MCHC RBC-ENTMCNC: 28 G/DL — LOW (ref 32–36)
MCV RBC AUTO: 89.3 FL — SIGNIFICANT CHANGE UP (ref 80–100)
MYCOPLASMA AG SPEC QL: NEGATIVE — SIGNIFICANT CHANGE UP
NRBC BLD AUTO-RTO: 2 /100 WBCS — HIGH (ref 0–0)
OB PNL STL: NEGATIVE — SIGNIFICANT CHANGE UP
PCO2 BLDV: 61 MMHG — HIGH (ref 42–55)
PH BLDV: 7.28 — LOW (ref 7.32–7.43)
PLATELET # BLD AUTO: 281 K/UL — SIGNIFICANT CHANGE UP (ref 150–400)
PO2 BLDV: 45 MMHG — SIGNIFICANT CHANGE UP (ref 25–45)
POTASSIUM SERPL-MCNC: 4.6 MMOL/L — SIGNIFICANT CHANGE UP (ref 3.5–5.3)
POTASSIUM SERPL-SCNC: 4.6 MMOL/L — SIGNIFICANT CHANGE UP (ref 3.5–5.3)
RBC # BLD: 3.08 M/UL — LOW (ref 3.8–5.2)
RBC # FLD: 18 % — HIGH (ref 10.3–14.5)
S PNEUM AG UR QL: NEGATIVE — SIGNIFICANT CHANGE UP
SAO2 % BLDV: 75.8 % — LOW (ref 94–98)
SODIUM SERPL-SCNC: 145 MMOL/L — SIGNIFICANT CHANGE UP (ref 135–145)
SPECIMEN SOURCE: SIGNIFICANT CHANGE UP
WBC # BLD: 7.63 K/UL — SIGNIFICANT CHANGE UP (ref 3.8–10.5)
WBC # FLD AUTO: 7.63 K/UL — SIGNIFICANT CHANGE UP (ref 3.8–10.5)

## 2025-06-04 PROCEDURE — 99233 SBSQ HOSP IP/OBS HIGH 50: CPT

## 2025-06-04 PROCEDURE — 99232 SBSQ HOSP IP/OBS MODERATE 35: CPT

## 2025-06-04 RX ORDER — IRON SUCROSE 20 MG/ML
100 INJECTION, SOLUTION INTRAVENOUS EVERY 24 HOURS
Refills: 0 | Status: DISCONTINUED | OUTPATIENT
Start: 2025-06-04 | End: 2025-06-06

## 2025-06-04 RX ADMIN — INSULIN LISPRO 3: 100 INJECTION, SOLUTION INTRAVENOUS; SUBCUTANEOUS at 08:18

## 2025-06-04 RX ADMIN — Medication 650 MILLIGRAM(S): at 00:19

## 2025-06-04 RX ADMIN — Medication 81 MILLIGRAM(S): at 11:22

## 2025-06-04 RX ADMIN — AMLODIPINE BESYLATE 10 MILLIGRAM(S): 10 TABLET ORAL at 05:44

## 2025-06-04 RX ADMIN — INSULIN GLARGINE-YFGN 15 UNIT(S): 100 INJECTION, SOLUTION SUBCUTANEOUS at 21:54

## 2025-06-04 RX ADMIN — IPRATROPIUM BROMIDE AND ALBUTEROL SULFATE 3 MILLILITER(S): .5; 2.5 SOLUTION RESPIRATORY (INHALATION) at 11:02

## 2025-06-04 RX ADMIN — IRON SUCROSE 210 MILLIGRAM(S): 20 INJECTION, SOLUTION INTRAVENOUS at 18:03

## 2025-06-04 RX ADMIN — IPRATROPIUM BROMIDE AND ALBUTEROL SULFATE 3 MILLILITER(S): .5; 2.5 SOLUTION RESPIRATORY (INHALATION) at 05:45

## 2025-06-04 RX ADMIN — INSULIN LISPRO 5: 100 INJECTION, SOLUTION INTRAVENOUS; SUBCUTANEOUS at 17:36

## 2025-06-04 RX ADMIN — Medication 25 MILLIGRAM(S): at 05:45

## 2025-06-04 RX ADMIN — CEFTRIAXONE 100 MILLIGRAM(S): 500 INJECTION, POWDER, FOR SOLUTION INTRAMUSCULAR; INTRAVENOUS at 11:20

## 2025-06-04 RX ADMIN — METOPROLOL SUCCINATE 100 MILLIGRAM(S): 50 TABLET, EXTENDED RELEASE ORAL at 05:45

## 2025-06-04 RX ADMIN — LOSARTAN POTASSIUM 100 MILLIGRAM(S): 100 TABLET, FILM COATED ORAL at 05:45

## 2025-06-04 RX ADMIN — IPRATROPIUM BROMIDE AND ALBUTEROL SULFATE 3 MILLILITER(S): .5; 2.5 SOLUTION RESPIRATORY (INHALATION) at 23:39

## 2025-06-04 RX ADMIN — Medication 650 MILLIGRAM(S): at 01:19

## 2025-06-04 RX ADMIN — INSULIN LISPRO 5: 100 INJECTION, SOLUTION INTRAVENOUS; SUBCUTANEOUS at 11:57

## 2025-06-04 RX ADMIN — METHYLPREDNISOLONE ACETATE 40 MILLIGRAM(S): 80 INJECTION, SUSPENSION INTRA-ARTICULAR; INTRALESIONAL; INTRAMUSCULAR; SOFT TISSUE at 05:45

## 2025-06-04 RX ADMIN — ENOXAPARIN SODIUM 40 MILLIGRAM(S): 100 INJECTION SUBCUTANEOUS at 11:21

## 2025-06-04 RX ADMIN — INSULIN LISPRO 1: 100 INJECTION, SOLUTION INTRAVENOUS; SUBCUTANEOUS at 21:54

## 2025-06-04 RX ADMIN — Medication 1 DOSE(S): at 05:44

## 2025-06-04 RX ADMIN — Medication 325 MILLIGRAM(S): at 11:22

## 2025-06-04 RX ADMIN — IPRATROPIUM BROMIDE AND ALBUTEROL SULFATE 3 MILLILITER(S): .5; 2.5 SOLUTION RESPIRATORY (INHALATION) at 17:58

## 2025-06-04 RX ADMIN — Medication 1 DOSE(S): at 17:37

## 2025-06-04 NOTE — CONSULT NOTE ADULT - SUBJECTIVE AND OBJECTIVE BOX
HPI:  This is a 59 year old F with PMH HTN, DM, COPD/asthma presenting with shortness of breath and cough which started yesterday. Denies fever, chills, chest pain, abdominal pain, nausea, vomiting. States she has had multiple episodes of pneumonia. History limited due to patient being on BIPAP.     In ED, patient spO2 in the 80s on room air, placed on bipap. VBG 7.3/56/43/28. Labs revealed WBC 12.64, hgb 7.5, Na 149, AST/ALT 44/54, pro-BNP 4823. CTA negative for PE, but suggest multifocal pneumonia. Recieved duonebs, steroids, ctx, azithro in ED. Upon evaluation, patient continues on bipap with improvement of symptoms.  (02 Jun 2025 14:45)      REVIEW OF SYSTEMS      General:	    Skin/Breast:  	  Ophthalmologic:  	  ENMT:	    Respiratory and Thorax:  	  Cardiovascular:	    Gastrointestinal:	    Genitourinary:	    Musculoskeletal:	    Neurological:	    Psychiatric:	    Hematology/Lymphatics:	    Endocrine:	    Allergic/Immunologic:	    MEDICATIONS  (STANDING):  albuterol/ipratropium for Nebulization 3 milliLiter(s) Nebulizer every 6 hours  amLODIPine   Tablet 10 milliGRAM(s) Oral daily  aspirin  chewable 81 milliGRAM(s) Oral daily  cefTRIAXone   IVPB 1000 milliGRAM(s) IV Intermittent every 24 hours  dextrose 5%. 1000 milliLiter(s) (50 mL/Hr) IV Continuous <Continuous>  dextrose 5%. 1000 milliLiter(s) (100 mL/Hr) IV Continuous <Continuous>  dextrose 50% Injectable 25 Gram(s) IV Push once  dextrose 50% Injectable 12.5 Gram(s) IV Push once  dextrose 50% Injectable 25 Gram(s) IV Push once  enoxaparin Injectable 40 milliGRAM(s) SubCutaneous every 24 hours  ferrous    sulfate 325 milliGRAM(s) Oral daily  fluticasone propionate/ salmeterol 250-50 MICROgram(s) Diskus 1 Dose(s) Inhalation two times a day  glucagon  Injectable 1 milliGRAM(s) IntraMuscular once  insulin glargine Injectable (LANTUS) 15 Unit(s) SubCutaneous at bedtime  insulin lispro (ADMELOG) corrective regimen sliding scale   SubCutaneous three times a day before meals  insulin lispro (ADMELOG) corrective regimen sliding scale   SubCutaneous at bedtime  iron sucrose IVPB 100 milliGRAM(s) IV Intermittent every 24 hours  losartan 100 milliGRAM(s) Oral daily  methylPREDNISolone sodium succinate Injectable 40 milliGRAM(s) IV Push daily  metoprolol succinate  milliGRAM(s) Oral daily  spironolactone 25 milliGRAM(s) Oral daily    MEDICATIONS  (PRN):  acetaminophen     Tablet .. 650 milliGRAM(s) Oral every 6 hours PRN Temp greater or equal to 38C (100.4F), Mild Pain (1 - 3)  aluminum hydroxide/magnesium hydroxide/simethicone Suspension 30 milliLiter(s) Oral every 4 hours PRN Dyspepsia  dextrose Oral Gel 15 Gram(s) Oral once PRN Blood Glucose LESS THAN 70 milliGRAM(s)/deciliter  melatonin 3 milliGRAM(s) Oral at bedtime PRN Insomnia  ondansetron Injectable 4 milliGRAM(s) IV Push every 8 hours PRN Nausea and/or Vomiting      Vital Signs Last 24 Hrs  T(C): 36.7 (04 Jun 2025 23:18), Max: 36.9 (04 Jun 2025 04:44)  T(F): 98.1 (04 Jun 2025 23:18), Max: 98.4 (04 Jun 2025 04:44)  HR: 94 (04 Jun 2025 23:18) (81 - 114)  BP: 122/79 (04 Jun 2025 23:18) (100/66 - 125/67)  BP(mean): 91 (04 Jun 2025 16:28) (91 - 91)  RR: 17 (04 Jun 2025 23:18) (16 - 19)  SpO2: 91% (04 Jun 2025 23:18) (91% - 97%)    Parameters below as of 04 Jun 2025 17:58  Patient On (Oxygen Delivery Method): nasal cannula        PHYSICAL EXAM:      Constitutional:    Eyes:    ENMT:    Neck:    Breasts:    Back:    Respiratory:    Cardiovascular:    Gastrointestinal:    Genitourinary:    Rectal:    Extremities:    Vascular:    Neurological:    Skin:    Lymph Nodes:    Musculoskeletal:    Psychiatric:            HEALTH ISSUES - PROBLEM Dx:            Assesment & Plan:         HPI:  This is a 59 year old F with PMH HTN, DM, COPD/asthma presenting with shortness of breath and cough which started yesterday. Denies fever, chills, chest pain, abdominal pain, nausea, vomiting. States she has had multiple episodes of pneumonia. History limited due to patient being on BIPAP.     In ED, patient spO2 in the 80s on room air, placed on bipap. VBG 7.3/56/43/28. Labs revealed WBC 12.64, hgb 7.5, Na 149, AST/ALT 44/54, pro-BNP 4823. CTA negative for PE, but suggest multifocal pneumonia. Recieved duonebs, steroids, ctx, azithro in ED. Upon evaluation, patient continues on bipap with improvement of symptoms.  (02 Jun 2025 14:45)  GI consult requested due to hx of intermittent bright red bleeding per rectum. She had biochemical findings indicative of iron deficiency. Her last colonoscopy was 4 yrs ago, with normal findings    REVIEW OF SYSTEMS      General: on supplemental Oxygen	    Skin/Breast:  	  Ophthalmologic:  	  ENMT:	    Respiratory and Thorax: SOB  	  Cardiovascular:	    Gastrointestinal:	    Genitourinary:	    Musculoskeletal:	    Neurological:	    Psychiatric:	    Hematology/Lymphatics:	    Endocrine:	    Allergic/Immunologic:	    MEDICATIONS  (STANDING):  albuterol/ipratropium for Nebulization 3 milliLiter(s) Nebulizer every 6 hours  amLODIPine   Tablet 10 milliGRAM(s) Oral daily  aspirin  chewable 81 milliGRAM(s) Oral daily  cefTRIAXone   IVPB 1000 milliGRAM(s) IV Intermittent every 24 hours  dextrose 5%. 1000 milliLiter(s) (50 mL/Hr) IV Continuous <Continuous>  dextrose 5%. 1000 milliLiter(s) (100 mL/Hr) IV Continuous <Continuous>  dextrose 50% Injectable 25 Gram(s) IV Push once  dextrose 50% Injectable 12.5 Gram(s) IV Push once  dextrose 50% Injectable 25 Gram(s) IV Push once  enoxaparin Injectable 40 milliGRAM(s) SubCutaneous every 24 hours  ferrous    sulfate 325 milliGRAM(s) Oral daily  fluticasone propionate/ salmeterol 250-50 MICROgram(s) Diskus 1 Dose(s) Inhalation two times a day  glucagon  Injectable 1 milliGRAM(s) IntraMuscular once  insulin glargine Injectable (LANTUS) 15 Unit(s) SubCutaneous at bedtime  insulin lispro (ADMELOG) corrective regimen sliding scale   SubCutaneous three times a day before meals  insulin lispro (ADMELOG) corrective regimen sliding scale   SubCutaneous at bedtime  iron sucrose IVPB 100 milliGRAM(s) IV Intermittent every 24 hours  losartan 100 milliGRAM(s) Oral daily  methylPREDNISolone sodium succinate Injectable 40 milliGRAM(s) IV Push daily  metoprolol succinate  milliGRAM(s) Oral daily  spironolactone 25 milliGRAM(s) Oral daily    MEDICATIONS  (PRN):  acetaminophen     Tablet .. 650 milliGRAM(s) Oral every 6 hours PRN Temp greater or equal to 38C (100.4F), Mild Pain (1 - 3)  aluminum hydroxide/magnesium hydroxide/simethicone Suspension 30 milliLiter(s) Oral every 4 hours PRN Dyspepsia  dextrose Oral Gel 15 Gram(s) Oral once PRN Blood Glucose LESS THAN 70 milliGRAM(s)/deciliter  melatonin 3 milliGRAM(s) Oral at bedtime PRN Insomnia  ondansetron Injectable 4 milliGRAM(s) IV Push every 8 hours PRN Nausea and/or Vomiting      Vital Signs Last 24 Hrs  T(C): 36.7 (04 Jun 2025 23:18), Max: 36.9 (04 Jun 2025 04:44)  T(F): 98.1 (04 Jun 2025 23:18), Max: 98.4 (04 Jun 2025 04:44)  HR: 94 (04 Jun 2025 23:18) (81 - 114)  BP: 122/79 (04 Jun 2025 23:18) (100/66 - 125/67)  BP(mean): 91 (04 Jun 2025 16:28) (91 - 91)  RR: 17 (04 Jun 2025 23:18) (16 - 19)  SpO2: 91% (04 Jun 2025 23:18) (91% - 97%)    Parameters below as of 04 Jun 2025 17:58  Patient On (Oxygen Delivery Method): nasal cannula        PHYSICAL EXAM:      Constitutional: in no acute distress    Eyes: normal    ENMT:    Neck: supple    Breasts:    Back:    Respiratory: diminished breath sounds    Cardiovascular: normal    Gastrointestinal: normal    Genitourinary:    Rectal:    Extremities:    Vascular:    Neurological:    Skin:    Lymph Nodes:    Musculoskeletal:    Psychiatric:            HEALTH ISSUES - PROBLEM Dx:            Assesment & Plan: Rectal bleeding. Resuscitate as indicated. Colonoscopy electively when respiratory status better

## 2025-06-04 NOTE — PROGRESS NOTE ADULT - SUBJECTIVE AND OBJECTIVE BOX
Patient is a 59y old  Female who presents with a chief complaint of Hypoxia (04 Jun 2025 07:34)      INTERVAL HPI/OVERNIGHT EVENTS:  afebrile overnight vitals stable   seen and examined at bedside     MEDICATIONS  (STANDING):  albuterol/ipratropium for Nebulization 3 milliLiter(s) Nebulizer every 6 hours  amLODIPine   Tablet 10 milliGRAM(s) Oral daily  aspirin  chewable 81 milliGRAM(s) Oral daily  cefTRIAXone   IVPB 1000 milliGRAM(s) IV Intermittent every 24 hours  dextrose 5%. 1000 milliLiter(s) (50 mL/Hr) IV Continuous <Continuous>  dextrose 5%. 1000 milliLiter(s) (100 mL/Hr) IV Continuous <Continuous>  dextrose 50% Injectable 25 Gram(s) IV Push once  dextrose 50% Injectable 12.5 Gram(s) IV Push once  dextrose 50% Injectable 25 Gram(s) IV Push once  enoxaparin Injectable 40 milliGRAM(s) SubCutaneous every 24 hours  ferrous    sulfate 325 milliGRAM(s) Oral daily  fluticasone propionate/ salmeterol 250-50 MICROgram(s) Diskus 1 Dose(s) Inhalation two times a day  glucagon  Injectable 1 milliGRAM(s) IntraMuscular once  insulin glargine Injectable (LANTUS) 15 Unit(s) SubCutaneous at bedtime  insulin lispro (ADMELOG) corrective regimen sliding scale   SubCutaneous three times a day before meals  insulin lispro (ADMELOG) corrective regimen sliding scale   SubCutaneous at bedtime  losartan 100 milliGRAM(s) Oral daily  methylPREDNISolone sodium succinate Injectable 40 milliGRAM(s) IV Push daily  metoprolol succinate  milliGRAM(s) Oral daily  spironolactone 25 milliGRAM(s) Oral daily    MEDICATIONS  (PRN):  acetaminophen     Tablet .. 650 milliGRAM(s) Oral every 6 hours PRN Temp greater or equal to 38C (100.4F), Mild Pain (1 - 3)  aluminum hydroxide/magnesium hydroxide/simethicone Suspension 30 milliLiter(s) Oral every 4 hours PRN Dyspepsia  dextrose Oral Gel 15 Gram(s) Oral once PRN Blood Glucose LESS THAN 70 milliGRAM(s)/deciliter  melatonin 3 milliGRAM(s) Oral at bedtime PRN Insomnia  ondansetron Injectable 4 milliGRAM(s) IV Push every 8 hours PRN Nausea and/or Vomiting      Allergies    No Known Allergies    Intolerances        REVIEW OF SYSTEMS:  CONSTITUTIONAL: No fever, weight loss, or fatigue  EYES: No eye pain, visual disturbances, or discharge  ENMT:  No difficulty hearing, tinnitus, vertigo; No sinus or throat pain  NECK: No pain or stiffness  BREASTS: No pain, masses, or nipple discharge  RESPIRATORY: No cough, wheezing, chills or hemoptysis; No shortness of breath  CARDIOVASCULAR: No chest pain, palpitations, dizziness, or leg swelling  GASTROINTESTINAL: No abdominal or epigastric pain. No nausea, vomiting, or hematemesis; No diarrhea or constipation. No melena or hematochezia.  GENITOURINARY: No dysuria, frequency, hematuria, or incontinence  NEUROLOGICAL: No headaches, memory loss, loss of strength, numbness, or tremors  SKIN: No itching, burning, rashes, or lesions   LYMPH NODES: No enlarged glands  ENDOCRINE: No heat or cold intolerance; No hair loss  MUSCULOSKELETAL: No joint pain or swelling; No muscle, back, or extremity pain  PSYCHIATRIC: No depression, anxiety, mood swings, or difficulty sleeping  HEME/LYMPH: No easy bruising, or bleeding gums  ALLERGY AND IMMUNOLOGIC: No hives or eczema    Vital Signs Last 24 Hrs  T(C): 36.9 (04 Jun 2025 04:44), Max: 37.2 (03 Jun 2025 16:20)  T(F): 98.4 (04 Jun 2025 04:44), Max: 98.9 (03 Jun 2025 16:20)  HR: 89 (04 Jun 2025 05:45) (78 - 97)  BP: 125/67 (04 Jun 2025 04:44) (105/71 - 125/67)  BP(mean): 84 (03 Jun 2025 16:20) (84 - 84)  RR: 19 (04 Jun 2025 04:44) (18 - 20)  SpO2: 96% (04 Jun 2025 05:45) (94% - 99%)    Parameters below as of 04 Jun 2025 05:45  Patient On (Oxygen Delivery Method): nasal cannula        PHYSICAL EXAM:  GENERAL: NAD, well-groomed, well-developed  HEAD:  Atraumatic, Normocephalic  EYES: EOMI, PERRLA, conjunctiva and sclera clear  ENMT: No tonsillar erythema, exudates, or enlargement; Moist mucous membranes, Good dentition, No lesions  NECK: Supple, No JVD, Normal thyroid  NERVOUS SYSTEM:  Alert & Oriented X3, Good concentration; Motor Strength 5/5 B/L upper and lower extremities; DTRs 2+ intact and symmetric  CHEST/LUNG: Clear to percussion bilaterally; No rales, rhonchi, wheezing, or rubs  HEART: Regular rate and rhythm; No murmurs, rubs, or gallops  ABDOMEN: Soft, Nontender, Nondistended; Bowel sounds present  EXTREMITIES:  2+ Peripheral Pulses, No clubbing, cyanosis, or edema  LYMPH: No lymphadenopathy noted  SKIN: No rashes or lesions    LABS:                        7.7    7.63  )-----------( 281      ( 04 Jun 2025 07:29 )             27.5     06-03    149[H]  |  116[H]  |  16  ----------------------------<  68[L]  4.2   |  29  |  0.66    Ca    8.7      03 Jun 2025 06:15        Urinalysis Basic - ( 03 Jun 2025 06:15 )    Color: x / Appearance: x / SG: x / pH: x  Gluc: 68 mg/dL / Ketone: x  / Bili: x / Urobili: x   Blood: x / Protein: x / Nitrite: x   Leuk Esterase: x / RBC: x / WBC x   Sq Epi: x / Non Sq Epi: x / Bacteria: x      CAPILLARY BLOOD GLUCOSE      POCT Blood Glucose.: 295 mg/dL (04 Jun 2025 07:52)  POCT Blood Glucose.: 380 mg/dL (03 Jun 2025 21:50)  POCT Blood Glucose.: 340 mg/dL (03 Jun 2025 17:09)  POCT Blood Glucose.: 216 mg/dL (03 Jun 2025 11:10)      RADIOLOGY & ADDITIONAL TESTS:    Imaging Personally Reviewed:  [ X] YES  [ ] NO    Consultant(s) Notes Reviewed:  [ X] YES  [ ] NO    Care Discussed with Consultants/Other Providers [X ] YES  [ ] NO Patient is a 59y old  Female who presents with a chief complaint of Hypoxia (04 Jun 2025 07:34)      INTERVAL HPI/OVERNIGHT EVENTS:  afebrile overnight vitals stable   seen and examined at bedside states breathing has improving     MEDICATIONS  (STANDING):  albuterol/ipratropium for Nebulization 3 milliLiter(s) Nebulizer every 6 hours  amLODIPine   Tablet 10 milliGRAM(s) Oral daily  aspirin  chewable 81 milliGRAM(s) Oral daily  cefTRIAXone   IVPB 1000 milliGRAM(s) IV Intermittent every 24 hours  dextrose 5%. 1000 milliLiter(s) (50 mL/Hr) IV Continuous <Continuous>  dextrose 5%. 1000 milliLiter(s) (100 mL/Hr) IV Continuous <Continuous>  dextrose 50% Injectable 25 Gram(s) IV Push once  dextrose 50% Injectable 12.5 Gram(s) IV Push once  dextrose 50% Injectable 25 Gram(s) IV Push once  enoxaparin Injectable 40 milliGRAM(s) SubCutaneous every 24 hours  ferrous    sulfate 325 milliGRAM(s) Oral daily  fluticasone propionate/ salmeterol 250-50 MICROgram(s) Diskus 1 Dose(s) Inhalation two times a day  glucagon  Injectable 1 milliGRAM(s) IntraMuscular once  insulin glargine Injectable (LANTUS) 15 Unit(s) SubCutaneous at bedtime  insulin lispro (ADMELOG) corrective regimen sliding scale   SubCutaneous three times a day before meals  insulin lispro (ADMELOG) corrective regimen sliding scale   SubCutaneous at bedtime  losartan 100 milliGRAM(s) Oral daily  methylPREDNISolone sodium succinate Injectable 40 milliGRAM(s) IV Push daily  metoprolol succinate  milliGRAM(s) Oral daily  spironolactone 25 milliGRAM(s) Oral daily    MEDICATIONS  (PRN):  acetaminophen     Tablet .. 650 milliGRAM(s) Oral every 6 hours PRN Temp greater or equal to 38C (100.4F), Mild Pain (1 - 3)  aluminum hydroxide/magnesium hydroxide/simethicone Suspension 30 milliLiter(s) Oral every 4 hours PRN Dyspepsia  dextrose Oral Gel 15 Gram(s) Oral once PRN Blood Glucose LESS THAN 70 milliGRAM(s)/deciliter  melatonin 3 milliGRAM(s) Oral at bedtime PRN Insomnia  ondansetron Injectable 4 milliGRAM(s) IV Push every 8 hours PRN Nausea and/or Vomiting      Allergies    No Known Allergies    Intolerances        REVIEW OF SYSTEMS:  +sob improving   no CP abdominal pain n/v/d LE edema     Vital Signs Last 24 Hrs  T(C): 36.9 (04 Jun 2025 04:44), Max: 37.2 (03 Jun 2025 16:20)  T(F): 98.4 (04 Jun 2025 04:44), Max: 98.9 (03 Jun 2025 16:20)  HR: 89 (04 Jun 2025 05:45) (78 - 97)  BP: 125/67 (04 Jun 2025 04:44) (105/71 - 125/67)  BP(mean): 84 (03 Jun 2025 16:20) (84 - 84)  RR: 19 (04 Jun 2025 04:44) (18 - 20)  SpO2: 96% (04 Jun 2025 05:45) (94% - 99%)    Parameters below as of 04 Jun 2025 05:45  Patient On (Oxygen Delivery Method): nasal cannula        PHYSICAL EXAM:  GENERAL: NAD, well-groomed, well-developed  HEAD:  Atraumatic, Normocephalic  EYES: EOMI, sclera non-icteric  ENMT:  Moist mucous membranes,   NERVOUS SYSTEM:  Alert & Oriented X3, Good concentration  CHEST/LUNG: coarse breath sounds b/l no increase WOB  HEART: Regular rate and rhythm; No murmurs, rubs, or gallops  ABDOMEN: Soft, Nontender, Nondistended; Bowel sounds present  EXTREMITIES:  No Le edema   SKIN: warm dry       LABS:                        7.7    7.63  )-----------( 281      ( 04 Jun 2025 07:29 )             27.5     06-03    149[H]  |  116[H]  |  16  ----------------------------<  68[L]  4.2   |  29  |  0.66    Ca    8.7      03 Jun 2025 06:15        Urinalysis Basic - ( 03 Jun 2025 06:15 )    Color: x / Appearance: x / SG: x / pH: x  Gluc: 68 mg/dL / Ketone: x  / Bili: x / Urobili: x   Blood: x / Protein: x / Nitrite: x   Leuk Esterase: x / RBC: x / WBC x   Sq Epi: x / Non Sq Epi: x / Bacteria: x      CAPILLARY BLOOD GLUCOSE      POCT Blood Glucose.: 295 mg/dL (04 Jun 2025 07:52)  POCT Blood Glucose.: 380 mg/dL (03 Jun 2025 21:50)  POCT Blood Glucose.: 340 mg/dL (03 Jun 2025 17:09)  POCT Blood Glucose.: 216 mg/dL (03 Jun 2025 11:10)      RADIOLOGY & ADDITIONAL TESTS:    Imaging Personally Reviewed:  [ X] YES  [ ] NO    Consultant(s) Notes Reviewed:  [ X] YES  [ ] NO    Care Discussed with Consultants/Other Providers [X ] YES  [ ] NO

## 2025-06-04 NOTE — PROGRESS NOTE ADULT - SUBJECTIVE AND OBJECTIVE BOX
Interval Events: NAEO. On 4L NC & bipap @ night    REVIEW OF SYSTEMS:  Constitutional: [ ] negative [ ] fevers [ ] chills [ ] weight loss [ ] weight gain  HEENT: [ ] negative [ ] dry eyes [ ] eye irritation [ ] postnasal drip [ ] nasal congestion  CV: [ ] negative  [ ] chest pain [ ] orthopnea [ ] palpitations [ ] murmur  Resp: [ ] negative [ ] cough [ ] shortness of breath [ ] dyspnea [ ] wheezing [ ] sputum [ ] hemoptysis  GI: [ ] negative [ ] nausea [ ] vomiting [ ] diarrhea [ ] constipation [ ] abd pain [ ] dysphagia   : [ ] negative [ ] dysuria [ ] nocturia [ ] hematuria [ ] increased urinary frequency  Musculoskeletal: [ ] negative [ ] back pain [ ] myalgias [ ] arthralgias [ ] fracture  Skin: [ ] negative [ ] rash [ ] itch  Neurological: [ ] negative [ ] headache [ ] dizziness [ ] syncope [ ] weakness [ ] numbness  Psychiatric: [ ] negative [ ] anxiety [ ] depression  Endocrine: [ ] negative [ ] diabetes [ ] thyroid problem  Hematologic/Lymphatic: [ ] negative [ ] anemia [ ] bleeding problem  Allergic/Immunologic: [ ] negative [ ] itchy eyes [ ] nasal discharge [ ] hives [ ] angioedema  [x ] All other systems negative  [ ] Unable to assess ROS because ________    OBJECTIVE:  ICU Vital Signs Last 24 Hrs  T(C): 36.9 (04 Jun 2025 04:44), Max: 37.2 (03 Jun 2025 16:20)  T(F): 98.4 (04 Jun 2025 04:44), Max: 98.9 (03 Jun 2025 16:20)  HR: 89 (04 Jun 2025 05:45) (78 - 97)  BP: 125/67 (04 Jun 2025 04:44) (105/71 - 125/67)  BP(mean): 84 (03 Jun 2025 16:20) (84 - 84)  ABP: --  ABP(mean): --  RR: 19 (04 Jun 2025 04:44) (18 - 20)  SpO2: 96% (04 Jun 2025 05:45) (94% - 99%)    O2 Parameters below as of 04 Jun 2025 05:45  Patient On (Oxygen Delivery Method): nasal cannula              06-03 @ 07:01  -  06-04 @ 07:00  --------------------------------------------------------  IN: 320 mL / OUT: 0 mL / NET: 320 mL      CAPILLARY BLOOD GLUCOSE      POCT Blood Glucose.: 380 mg/dL (03 Jun 2025 21:50)      PHYSICAL EXAM:  General: Awake, alert, oriented X 3  Respiratory: +wheezing with rhonchi b/l   Cardiovascular: RRR  Abdomen: Soft, non-tender, non-distended. +BS        HOSPITAL MEDICATIONS:  aspirin  chewable 81 milliGRAM(s) Oral daily  enoxaparin Injectable 40 milliGRAM(s) SubCutaneous every 24 hours    azithromycin  IVPB 500 milliGRAM(s) IV Intermittent daily  cefTRIAXone   IVPB 1000 milliGRAM(s) IV Intermittent every 24 hours    amLODIPine   Tablet 10 milliGRAM(s) Oral daily  losartan 100 milliGRAM(s) Oral daily  metoprolol succinate  milliGRAM(s) Oral daily  spironolactone 25 milliGRAM(s) Oral daily    dextrose 50% Injectable 25 Gram(s) IV Push once  dextrose 50% Injectable 12.5 Gram(s) IV Push once  dextrose 50% Injectable 25 Gram(s) IV Push once  dextrose Oral Gel 15 Gram(s) Oral once PRN  glucagon  Injectable 1 milliGRAM(s) IntraMuscular once  insulin glargine Injectable (LANTUS) 15 Unit(s) SubCutaneous at bedtime  insulin lispro (ADMELOG) corrective regimen sliding scale   SubCutaneous three times a day before meals  insulin lispro (ADMELOG) corrective regimen sliding scale   SubCutaneous at bedtime  methylPREDNISolone sodium succinate Injectable 40 milliGRAM(s) IV Push daily    albuterol/ipratropium for Nebulization 3 milliLiter(s) Nebulizer every 6 hours  fluticasone propionate/ salmeterol 250-50 MICROgram(s) Diskus 1 Dose(s) Inhalation two times a day    acetaminophen     Tablet .. 650 milliGRAM(s) Oral every 6 hours PRN  melatonin 3 milliGRAM(s) Oral at bedtime PRN  ondansetron Injectable 4 milliGRAM(s) IV Push every 8 hours PRN    aluminum hydroxide/magnesium hydroxide/simethicone Suspension 30 milliLiter(s) Oral every 4 hours PRN        dextrose 5%. 1000 milliLiter(s) IV Continuous <Continuous>  dextrose 5%. 1000 milliLiter(s) IV Continuous <Continuous>  ferrous    sulfate 325 milliGRAM(s) Oral daily            LABS:                        6.9    7.10  )-----------( 284      ( 03 Jun 2025 06:15 )             24.6     Hgb Trend: 6.9<--, 7.5<--  06-03    149[H]  |  116[H]  |  16  ----------------------------<  68[L]  4.2   |  29  |  0.66    Ca    8.7      03 Jun 2025 06:15    TPro  7.0  /  Alb  3.3  /  TBili  0.5  /  DBili  x   /  AST  44[H]  /  ALT  54  /  AlkPhos  106  06-02    Creatinine Trend: 0.66<--, 0.86<--    Urinalysis Basic - ( 03 Jun 2025 06:15 )    Color: x / Appearance: x / SG: x / pH: x  Gluc: 68 mg/dL / Ketone: x  / Bili: x / Urobili: x   Blood: x / Protein: x / Nitrite: x   Leuk Esterase: x / RBC: x / WBC x   Sq Epi: x / Non Sq Epi: x / Bacteria: x      Arterial Blood Gas:  06-02 @ 18:22  7.26/60/103/27/98.4/-1.4  ABG lactate: --    Venous Blood Gas:  06-02 @ 15:38  7.23/67/33/28/47.2  VBG Lactate: 0.80  Venous Blood Gas:  06-02 @ 09:09  7.30/56/43/28/66.4  VBG Lactate: 1.20

## 2025-06-04 NOTE — PROGRESS NOTE ADULT - ASSESSMENT
This is a 59 year old F with PMH HTN, DM, COPD/asthma presenting with shortness of breath and cough which started yesterday. CT Chest with Multifocal PNA. Started on BIPAP 2/2 respiratory acidosis. Pulmonary consulted.    DX: Acute on chronic hypoxemic/hypercapnic respiratory failure, Multifocal PNA.     Reccs:  - Is on 4L NC (baseline 2L PRN) titrate as tolerated for goal >88%  - continue bipap at night   - FU sputum x   - strep, legionella, mycoplasma, flu, rsv covid negative   - C/W CAP coverage  - Can continue with solumedrol 40mg IVP qd for now. Aim for 5 day course of steroids but titrate base on clinical course.  - C/W advair, Standing Duonebs  - Rest of care as per primary team    Discussed with Dr. Salinas.    This is a 59 year old F with PMH HTN, DM, COPD/asthma presenting with shortness of breath and cough which started yesterday. CT Chest with Multifocal PNA. Started on BIPAP 2/2 respiratory acidosis. Pulmonary consulted.    DX: Acute on chronic hypoxemic/hypercapnic respiratory failure, Multifocal PNA.     Reccs:  - Is on 4L NC (baseline 2L PRN) titrate as tolerated for goal >88%  - continue bipap at night   - FU sputum x   - strep, legionella, mycoplasma, flu, rsv covid negative   - C/W CAP coverage  - Can continue with solumedrol 40mg IVP qd for now. Aim for 5 day course of steroids but titrate base on clinical course.  - C/W advair, Standing Duonebs  - Rest of care as per primary team    Discussed with Dr. Bell

## 2025-06-04 NOTE — PROGRESS NOTE ADULT - ASSESSMENT
This is a 59 year old F with PMH HTN, DM, COPD/asthma presenting with shortness of breath and cough x 1 day admitted for acute hypoxic respiratory failure with multifocal pneumonia.     #Acute hypercarbic hypoxic respiratory failure   #Community acquire multifocal pneumonia   #Asthma/COPD exacerbation   Hypoxia 2/2 multifocal pneumonia with asthma/COPD exacerbation   CTA negative to PE, revealed multifocal pneumonia   Received IV steroids, duonebs, CTX in ED  MRSA neg, Strep/legionella, mycoplasma negative will dc azithro  -sputum cx pending   COVID/Flu/RSV neg  C/w Duonebs standing  IV solumedrol 40mg daily, plan for 5 day course per pulm  C/w Advair BID   CTX for CAP  Pulm following, appreciate recs     #Asthma/COPD  See above     #HTN  C/w home meds: metoprolol succ, amlodipine, losartan   Adjust as needed    DM2  A1C 6.7%  Per EMR, takes lantus 30units at bedtime, will start with Lantus 15 units HS and titrate as indicated   ISS, Accuchecks, Hypoglycemic protocol     Iron deficiency anemia   No signs of bleeding on exam   Iron panel, ferritin, b12, folate levels noted  Hgb 6.9 6/3, s/p1 unit PRBCs with appropriate increase in Hb  Start ferrous sulfate   Transfuse of hgb < 7   FOBT ordered     HLD   Mild elevation is AST, will hold statin    Obesity   Lifestyle modifications     DVT ppx  lovenox     Med rec from [employer]   This is a 59 year old F with PMH HTN, DM, COPD/asthma presenting with shortness of breath and cough x 1 day admitted for acute hypoxic respiratory failure with multifocal pneumonia.     #Acute hypercarbic hypoxic respiratory failure   #Community acquire multifocal pneumonia   #Asthma/COPD exacerbation   Hypoxia 2/2 multifocal pneumonia with asthma/COPD exacerbation   CTA negative to PE, revealed multifocal pneumonia   Received IV steroids, duonebs, CTX in ED  MRSA neg, Strep/legionella, mycoplasma negative will dc azithro  -sputum cx pending   COVID/Flu/RSV neg  C/w Duonebs standing  IV solumedrol 40mg daily, plan for 5 day course per pulm  C/w Advair BID   CTX for CAP  Pulm following, appreciate recs -->recommending home BIPAP, CM made aware     #Asthma/COPD  See above     #HTN  C/w home meds: metoprolol succ, amlodipine, losartan   Adjust as needed    DM2  A1C 6.7%  Per EMR, takes lantus 30units at bedtime, will start with Lantus 15 units HS and titrate as indicated   ISS, Accuchecks, Hypoglycemic protocol     Iron deficiency anemia   No signs of bleeding on exam   Iron panel, ferritin, b12, folate levels noted  Hgb 6.9 6/3, s/p1 unit PRBCs with appropriate increase in Hb  Start ferrous sulfate   Transfuse of hgb < 7   FOBT ordered but patient has not yet had a BM    HLD   Mild elevation is AST, will hold statin    Obesity   Lifestyle modifications     DVT ppx  lovenox

## 2025-06-05 LAB
ANION GAP SERPL CALC-SCNC: 6 MMOL/L — SIGNIFICANT CHANGE UP (ref 5–17)
BUN SERPL-MCNC: 21 MG/DL — SIGNIFICANT CHANGE UP (ref 7–23)
CALCIUM SERPL-MCNC: 8.9 MG/DL — SIGNIFICANT CHANGE UP (ref 8.5–10.1)
CHLORIDE SERPL-SCNC: 112 MMOL/L — HIGH (ref 96–108)
CO2 SERPL-SCNC: 29 MMOL/L — SIGNIFICANT CHANGE UP (ref 22–31)
CREAT SERPL-MCNC: 1.05 MG/DL — SIGNIFICANT CHANGE UP (ref 0.5–1.3)
EGFR: 61 ML/MIN/1.73M2 — SIGNIFICANT CHANGE UP
EGFR: 61 ML/MIN/1.73M2 — SIGNIFICANT CHANGE UP
GLUCOSE BLDC GLUCOMTR-MCNC: 265 MG/DL — HIGH (ref 70–99)
GLUCOSE BLDC GLUCOMTR-MCNC: 286 MG/DL — HIGH (ref 70–99)
GLUCOSE BLDC GLUCOMTR-MCNC: 381 MG/DL — HIGH (ref 70–99)
GLUCOSE BLDC GLUCOMTR-MCNC: 410 MG/DL — HIGH (ref 70–99)
GLUCOSE SERPL-MCNC: 326 MG/DL — HIGH (ref 70–99)
HCT VFR BLD CALC: 28.7 % — LOW (ref 34.5–45)
HGB BLD-MCNC: 8.3 G/DL — LOW (ref 11.5–15.5)
MAGNESIUM SERPL-MCNC: 2.6 MG/DL — SIGNIFICANT CHANGE UP (ref 1.6–2.6)
MCHC RBC-ENTMCNC: 25.8 PG — LOW (ref 27–34)
MCHC RBC-ENTMCNC: 28.9 G/DL — LOW (ref 32–36)
MCV RBC AUTO: 89.1 FL — SIGNIFICANT CHANGE UP (ref 80–100)
NRBC BLD AUTO-RTO: 2 /100 WBCS — HIGH (ref 0–0)
PHOSPHATE SERPL-MCNC: 1.8 MG/DL — LOW (ref 2.5–4.5)
PLATELET # BLD AUTO: 330 K/UL — SIGNIFICANT CHANGE UP (ref 150–400)
POTASSIUM SERPL-MCNC: 4.6 MMOL/L — SIGNIFICANT CHANGE UP (ref 3.5–5.3)
POTASSIUM SERPL-SCNC: 4.6 MMOL/L — SIGNIFICANT CHANGE UP (ref 3.5–5.3)
RBC # BLD: 3.22 M/UL — LOW (ref 3.8–5.2)
RBC # FLD: 17.6 % — HIGH (ref 10.3–14.5)
SODIUM SERPL-SCNC: 147 MMOL/L — HIGH (ref 135–145)
WBC # BLD: 7.13 K/UL — SIGNIFICANT CHANGE UP (ref 3.8–10.5)
WBC # FLD AUTO: 7.13 K/UL — SIGNIFICANT CHANGE UP (ref 3.8–10.5)

## 2025-06-05 PROCEDURE — 99232 SBSQ HOSP IP/OBS MODERATE 35: CPT

## 2025-06-05 PROCEDURE — 99233 SBSQ HOSP IP/OBS HIGH 50: CPT

## 2025-06-05 RX ORDER — INSULIN LISPRO 100 U/ML
6 INJECTION, SOLUTION INTRAVENOUS; SUBCUTANEOUS
Refills: 0 | Status: DISCONTINUED | OUTPATIENT
Start: 2025-06-05 | End: 2025-06-06

## 2025-06-05 RX ORDER — POTASSIUM PHOSPHATE, MONOBASIC POTASSIUM PHOSPHATE, DIBASIC INJECTION, 236; 224 MG/ML; MG/ML
15 SOLUTION, CONCENTRATE INTRAVENOUS ONCE
Refills: 0 | Status: COMPLETED | OUTPATIENT
Start: 2025-06-05 | End: 2025-06-05

## 2025-06-05 RX ORDER — CYANOCOBALAMIN 1000 UG/ML
1000 INJECTION INTRAMUSCULAR; SUBCUTANEOUS DAILY
Refills: 0 | Status: DISCONTINUED | OUTPATIENT
Start: 2025-06-05 | End: 2025-06-06

## 2025-06-05 RX ORDER — PREDNISONE 20 MG/1
40 TABLET ORAL DAILY
Refills: 0 | Status: DISCONTINUED | OUTPATIENT
Start: 2025-06-06 | End: 2025-06-06

## 2025-06-05 RX ORDER — SENNA 187 MG
2 TABLET ORAL AT BEDTIME
Refills: 0 | Status: DISCONTINUED | OUTPATIENT
Start: 2025-06-05 | End: 2025-06-06

## 2025-06-05 RX ADMIN — Medication 325 MILLIGRAM(S): at 11:26

## 2025-06-05 RX ADMIN — METOPROLOL SUCCINATE 100 MILLIGRAM(S): 50 TABLET, EXTENDED RELEASE ORAL at 06:34

## 2025-06-05 RX ADMIN — INSULIN LISPRO 5: 100 INJECTION, SOLUTION INTRAVENOUS; SUBCUTANEOUS at 17:07

## 2025-06-05 RX ADMIN — ENOXAPARIN SODIUM 40 MILLIGRAM(S): 100 INJECTION SUBCUTANEOUS at 11:26

## 2025-06-05 RX ADMIN — AMLODIPINE BESYLATE 10 MILLIGRAM(S): 10 TABLET ORAL at 06:33

## 2025-06-05 RX ADMIN — IPRATROPIUM BROMIDE AND ALBUTEROL SULFATE 3 MILLILITER(S): .5; 2.5 SOLUTION RESPIRATORY (INHALATION) at 11:21

## 2025-06-05 RX ADMIN — IRON SUCROSE 210 MILLIGRAM(S): 20 INJECTION, SOLUTION INTRAVENOUS at 17:54

## 2025-06-05 RX ADMIN — INSULIN LISPRO 3: 100 INJECTION, SOLUTION INTRAVENOUS; SUBCUTANEOUS at 07:54

## 2025-06-05 RX ADMIN — IPRATROPIUM BROMIDE AND ALBUTEROL SULFATE 3 MILLILITER(S): .5; 2.5 SOLUTION RESPIRATORY (INHALATION) at 23:19

## 2025-06-05 RX ADMIN — Medication 81 MILLIGRAM(S): at 11:26

## 2025-06-05 RX ADMIN — INSULIN LISPRO 6: 100 INJECTION, SOLUTION INTRAVENOUS; SUBCUTANEOUS at 11:26

## 2025-06-05 RX ADMIN — INSULIN GLARGINE-YFGN 15 UNIT(S): 100 INJECTION, SOLUTION SUBCUTANEOUS at 21:09

## 2025-06-05 RX ADMIN — IPRATROPIUM BROMIDE AND ALBUTEROL SULFATE 3 MILLILITER(S): .5; 2.5 SOLUTION RESPIRATORY (INHALATION) at 05:42

## 2025-06-05 RX ADMIN — LOSARTAN POTASSIUM 100 MILLIGRAM(S): 100 TABLET, FILM COATED ORAL at 06:33

## 2025-06-05 RX ADMIN — INSULIN LISPRO 6 UNIT(S): 100 INJECTION, SOLUTION INTRAVENOUS; SUBCUTANEOUS at 17:07

## 2025-06-05 RX ADMIN — Medication 25 MILLIGRAM(S): at 06:34

## 2025-06-05 RX ADMIN — CEFTRIAXONE 100 MILLIGRAM(S): 500 INJECTION, POWDER, FOR SOLUTION INTRAMUSCULAR; INTRAVENOUS at 11:26

## 2025-06-05 RX ADMIN — IPRATROPIUM BROMIDE AND ALBUTEROL SULFATE 3 MILLILITER(S): .5; 2.5 SOLUTION RESPIRATORY (INHALATION) at 17:13

## 2025-06-05 RX ADMIN — CYANOCOBALAMIN 1000 MICROGRAM(S): 1000 INJECTION INTRAMUSCULAR; SUBCUTANEOUS at 11:26

## 2025-06-05 RX ADMIN — Medication 1 DOSE(S): at 17:08

## 2025-06-05 RX ADMIN — Medication 1 DOSE(S): at 06:33

## 2025-06-05 RX ADMIN — POTASSIUM PHOSPHATE, MONOBASIC POTASSIUM PHOSPHATE, DIBASIC INJECTION, 62.5 MILLIMOLE(S): 236; 224 SOLUTION, CONCENTRATE INTRAVENOUS at 12:32

## 2025-06-05 RX ADMIN — METHYLPREDNISOLONE ACETATE 40 MILLIGRAM(S): 80 INJECTION, SUSPENSION INTRA-ARTICULAR; INTRALESIONAL; INTRAMUSCULAR; SOFT TISSUE at 06:33

## 2025-06-05 RX ADMIN — INSULIN LISPRO 1: 100 INJECTION, SOLUTION INTRAVENOUS; SUBCUTANEOUS at 21:09

## 2025-06-05 NOTE — PROGRESS NOTE ADULT - SUBJECTIVE AND OBJECTIVE BOX
Interval Events: NAEO. wore bipap during night. NC during day.     REVIEW OF SYSTEMS:  Constitutional: [ ] negative [ ] fevers [ ] chills [ ] weight loss [ ] weight gain  HEENT: [ ] negative [ ] dry eyes [ ] eye irritation [ ] postnasal drip [ ] nasal congestion  CV: [ ] negative  [ ] chest pain [ ] orthopnea [ ] palpitations [ ] murmur  Resp: [ ] negative [ ] cough [ ] shortness of breath [ ] dyspnea [ ] wheezing [ ] sputum [ ] hemoptysis  GI: [ ] negative [ ] nausea [ ] vomiting [ ] diarrhea [ ] constipation [ ] abd pain [ ] dysphagia   : [ ] negative [ ] dysuria [ ] nocturia [ ] hematuria [ ] increased urinary frequency  Musculoskeletal: [ ] negative [ ] back pain [ ] myalgias [ ] arthralgias [ ] fracture  Skin: [ ] negative [ ] rash [ ] itch  Neurological: [ ] negative [ ] headache [ ] dizziness [ ] syncope [ ] weakness [ ] numbness  Psychiatric: [ ] negative [ ] anxiety [ ] depression  Endocrine: [ ] negative [ ] diabetes [ ] thyroid problem  Hematologic/Lymphatic: [ ] negative [ ] anemia [ ] bleeding problem  Allergic/Immunologic: [ ] negative [ ] itchy eyes [ ] nasal discharge [ ] hives [ ] angioedema  [x ] All other systems negative  [ ] Unable to assess ROS because ________    OBJECTIVE:  ICU Vital Signs Last 24 Hrs  T(C): 36.6 (05 Jun 2025 04:57), Max: 36.8 (04 Jun 2025 10:14)  T(F): 97.9 (05 Jun 2025 04:57), Max: 98.3 (04 Jun 2025 10:14)  HR: 89 (05 Jun 2025 04:57) (85 - 114)  BP: 121/75 (05 Jun 2025 04:57) (100/66 - 122/79)  BP(mean): 91 (04 Jun 2025 16:28) (91 - 91)  ABP: --  ABP(mean): --  RR: 17 (05 Jun 2025 04:57) (16 - 19)  SpO2: 95% (05 Jun 2025 04:57) (91% - 97%)    O2 Parameters below as of 04 Jun 2025 17:58  Patient On (Oxygen Delivery Method): nasal cannula              06-04 @ 07:01  -  06-05 @ 07:00  --------------------------------------------------------  IN: 640 mL / OUT: 1000 mL / NET: -360 mL      CAPILLARY BLOOD GLUCOSE      POCT Blood Glucose.: 292 mg/dL (04 Jun 2025 21:30)      PHYSICAL EXAM:  General: Awake, alert, oriented X 3  Respiratory:  no wheezing, rhonchi or rales. no increased work of breathing. no accessory muscle use   Cardiovascular: RRR  Abdomen: Soft, non-tender, non-distended. +BS    HOSPITAL MEDICATIONS:  aspirin  chewable 81 milliGRAM(s) Oral daily  enoxaparin Injectable 40 milliGRAM(s) SubCutaneous every 24 hours    cefTRIAXone   IVPB 1000 milliGRAM(s) IV Intermittent every 24 hours    amLODIPine   Tablet 10 milliGRAM(s) Oral daily  losartan 100 milliGRAM(s) Oral daily  metoprolol succinate  milliGRAM(s) Oral daily  spironolactone 25 milliGRAM(s) Oral daily    dextrose 50% Injectable 25 Gram(s) IV Push once  dextrose 50% Injectable 12.5 Gram(s) IV Push once  dextrose 50% Injectable 25 Gram(s) IV Push once  dextrose Oral Gel 15 Gram(s) Oral once PRN  glucagon  Injectable 1 milliGRAM(s) IntraMuscular once  insulin glargine Injectable (LANTUS) 15 Unit(s) SubCutaneous at bedtime  insulin lispro (ADMELOG) corrective regimen sliding scale   SubCutaneous three times a day before meals  insulin lispro (ADMELOG) corrective regimen sliding scale   SubCutaneous at bedtime  methylPREDNISolone sodium succinate Injectable 40 milliGRAM(s) IV Push daily    albuterol/ipratropium for Nebulization 3 milliLiter(s) Nebulizer every 6 hours  fluticasone propionate/ salmeterol 250-50 MICROgram(s) Diskus 1 Dose(s) Inhalation two times a day    acetaminophen     Tablet .. 650 milliGRAM(s) Oral every 6 hours PRN  melatonin 3 milliGRAM(s) Oral at bedtime PRN  ondansetron Injectable 4 milliGRAM(s) IV Push every 8 hours PRN    aluminum hydroxide/magnesium hydroxide/simethicone Suspension 30 milliLiter(s) Oral every 4 hours PRN        dextrose 5%. 1000 milliLiter(s) IV Continuous <Continuous>  dextrose 5%. 1000 milliLiter(s) IV Continuous <Continuous>  ferrous    sulfate 325 milliGRAM(s) Oral daily  iron sucrose IVPB 100 milliGRAM(s) IV Intermittent every 24 hours            LABS:                        7.7    7.63  )-----------( 281      ( 04 Jun 2025 07:29 )             27.5     Hgb Trend: 7.7<--, 6.9<--, 7.5<--  06-04    145  |  112[H]  |  29[H]  ----------------------------<  256[H]  4.6   |  28  |  1.34[H]    Ca    8.3[L]      04 Jun 2025 07:29      Creatinine Trend: 1.34<--, 0.66<--, 0.86<--    Urinalysis Basic - ( 04 Jun 2025 07:29 )    Color: x / Appearance: x / SG: x / pH: x  Gluc: 256 mg/dL / Ketone: x  / Bili: x / Urobili: x   Blood: x / Protein: x / Nitrite: x   Leuk Esterase: x / RBC: x / WBC x   Sq Epi: x / Non Sq Epi: x / Bacteria: x        Venous Blood Gas:  06-04 @ 07:39  7.28/61/45/29/75.8  VBG Lactate: --         Interval Events: NAEO. wore bipap during night. NC during day weaned to 1L NC     REVIEW OF SYSTEMS:  Constitutional: [ ] negative [ ] fevers [ ] chills [ ] weight loss [ ] weight gain  HEENT: [ ] negative [ ] dry eyes [ ] eye irritation [ ] postnasal drip [ ] nasal congestion  CV: [ ] negative  [ ] chest pain [ ] orthopnea [ ] palpitations [ ] murmur  Resp: [ ] negative [ ] cough [ ] shortness of breath [ ] dyspnea [ ] wheezing [ ] sputum [ ] hemoptysis  GI: [ ] negative [ ] nausea [ ] vomiting [ ] diarrhea [ ] constipation [ ] abd pain [ ] dysphagia   : [ ] negative [ ] dysuria [ ] nocturia [ ] hematuria [ ] increased urinary frequency  Musculoskeletal: [ ] negative [ ] back pain [ ] myalgias [ ] arthralgias [ ] fracture  Skin: [ ] negative [ ] rash [ ] itch  Neurological: [ ] negative [ ] headache [ ] dizziness [ ] syncope [ ] weakness [ ] numbness  Psychiatric: [ ] negative [ ] anxiety [ ] depression  Endocrine: [ ] negative [ ] diabetes [ ] thyroid problem  Hematologic/Lymphatic: [ ] negative [ ] anemia [ ] bleeding problem  Allergic/Immunologic: [ ] negative [ ] itchy eyes [ ] nasal discharge [ ] hives [ ] angioedema  [x ] All other systems negative  [ ] Unable to assess ROS because ________    OBJECTIVE:  ICU Vital Signs Last 24 Hrs  T(C): 36.6 (05 Jun 2025 04:57), Max: 36.8 (04 Jun 2025 10:14)  T(F): 97.9 (05 Jun 2025 04:57), Max: 98.3 (04 Jun 2025 10:14)  HR: 89 (05 Jun 2025 04:57) (85 - 114)  BP: 121/75 (05 Jun 2025 04:57) (100/66 - 122/79)  BP(mean): 91 (04 Jun 2025 16:28) (91 - 91)  ABP: --  ABP(mean): --  RR: 17 (05 Jun 2025 04:57) (16 - 19)  SpO2: 95% (05 Jun 2025 04:57) (91% - 97%)    O2 Parameters below as of 04 Jun 2025 17:58  Patient On (Oxygen Delivery Method): nasal cannula              06-04 @ 07:01  -  06-05 @ 07:00  --------------------------------------------------------  IN: 640 mL / OUT: 1000 mL / NET: -360 mL      CAPILLARY BLOOD GLUCOSE      POCT Blood Glucose.: 292 mg/dL (04 Jun 2025 21:30)      PHYSICAL EXAM:  General: Awake, alert, oriented X 3  Respiratory:  no wheezing, rhonchi or rales. no increased work of breathing. no accessory muscle use   Cardiovascular: RRR  Abdomen: Soft, non-tender, non-distended. +BS    HOSPITAL MEDICATIONS:  aspirin  chewable 81 milliGRAM(s) Oral daily  enoxaparin Injectable 40 milliGRAM(s) SubCutaneous every 24 hours    cefTRIAXone   IVPB 1000 milliGRAM(s) IV Intermittent every 24 hours    amLODIPine   Tablet 10 milliGRAM(s) Oral daily  losartan 100 milliGRAM(s) Oral daily  metoprolol succinate  milliGRAM(s) Oral daily  spironolactone 25 milliGRAM(s) Oral daily    dextrose 50% Injectable 25 Gram(s) IV Push once  dextrose 50% Injectable 12.5 Gram(s) IV Push once  dextrose 50% Injectable 25 Gram(s) IV Push once  dextrose Oral Gel 15 Gram(s) Oral once PRN  glucagon  Injectable 1 milliGRAM(s) IntraMuscular once  insulin glargine Injectable (LANTUS) 15 Unit(s) SubCutaneous at bedtime  insulin lispro (ADMELOG) corrective regimen sliding scale   SubCutaneous three times a day before meals  insulin lispro (ADMELOG) corrective regimen sliding scale   SubCutaneous at bedtime  methylPREDNISolone sodium succinate Injectable 40 milliGRAM(s) IV Push daily    albuterol/ipratropium for Nebulization 3 milliLiter(s) Nebulizer every 6 hours  fluticasone propionate/ salmeterol 250-50 MICROgram(s) Diskus 1 Dose(s) Inhalation two times a day    acetaminophen     Tablet .. 650 milliGRAM(s) Oral every 6 hours PRN  melatonin 3 milliGRAM(s) Oral at bedtime PRN  ondansetron Injectable 4 milliGRAM(s) IV Push every 8 hours PRN    aluminum hydroxide/magnesium hydroxide/simethicone Suspension 30 milliLiter(s) Oral every 4 hours PRN        dextrose 5%. 1000 milliLiter(s) IV Continuous <Continuous>  dextrose 5%. 1000 milliLiter(s) IV Continuous <Continuous>  ferrous    sulfate 325 milliGRAM(s) Oral daily  iron sucrose IVPB 100 milliGRAM(s) IV Intermittent every 24 hours            LABS:                        7.7    7.63  )-----------( 281      ( 04 Jun 2025 07:29 )             27.5     Hgb Trend: 7.7<--, 6.9<--, 7.5<--  06-04    145  |  112[H]  |  29[H]  ----------------------------<  256[H]  4.6   |  28  |  1.34[H]    Ca    8.3[L]      04 Jun 2025 07:29      Creatinine Trend: 1.34<--, 0.66<--, 0.86<--    Urinalysis Basic - ( 04 Jun 2025 07:29 )    Color: x / Appearance: x / SG: x / pH: x  Gluc: 256 mg/dL / Ketone: x  / Bili: x / Urobili: x   Blood: x / Protein: x / Nitrite: x   Leuk Esterase: x / RBC: x / WBC x   Sq Epi: x / Non Sq Epi: x / Bacteria: x        Venous Blood Gas:  06-04 @ 07:39  7.28/61/45/29/75.8  VBG Lactate: --

## 2025-06-05 NOTE — PROGRESS NOTE ADULT - SUBJECTIVE AND OBJECTIVE BOX
HPI:  This is a 59 year old F with PMH HTN, DM, COPD/asthma presenting with shortness of breath and cough which started yesterday. Denies fever, chills, chest pain, abdominal pain, nausea, vomiting. States she has had multiple episodes of pneumonia. History limited due to patient being on BIPAP.     In ED, patient spO2 in the 80s on room air, placed on bipap. VBG 7.3/56/43/28. Labs revealed WBC 12.64, hgb 7.5, Na 149, AST/ALT 44/54, pro-BNP 4823. CTA negative for PE, but suggest multifocal pneumonia. Recieved duonebs, steroids, ctx, azithro in ED. Upon evaluation, patient continues on bipap with improvement of symptoms.  (02 Jun 2025 14:45)  No new complaints. Denies overt bleeding    REVIEW OF SYSTEMS unremarkable      General:	    Skin/Breast:  	  Ophthalmologic:  	  ENMT:	    Respiratory and Thorax:  	  Cardiovascular:	    Gastrointestinal:	    Genitourinary:	    Musculoskeletal:	    Neurological:	    Psychiatric:	    Hematology/Lymphatics:	    Endocrine:	    Allergic/Immunologic:	    MEDICATIONS  (STANDING):  albuterol/ipratropium for Nebulization 3 milliLiter(s) Nebulizer every 6 hours  amLODIPine   Tablet 10 milliGRAM(s) Oral daily  aspirin  chewable 81 milliGRAM(s) Oral daily  cefTRIAXone   IVPB 1000 milliGRAM(s) IV Intermittent every 24 hours  cyanocobalamin 1000 MICROGram(s) Oral daily  dextrose 5%. 1000 milliLiter(s) (50 mL/Hr) IV Continuous <Continuous>  dextrose 5%. 1000 milliLiter(s) (100 mL/Hr) IV Continuous <Continuous>  dextrose 50% Injectable 25 Gram(s) IV Push once  dextrose 50% Injectable 12.5 Gram(s) IV Push once  dextrose 50% Injectable 25 Gram(s) IV Push once  enoxaparin Injectable 40 milliGRAM(s) SubCutaneous every 24 hours  ferrous    sulfate 325 milliGRAM(s) Oral daily  fluticasone propionate/ salmeterol 250-50 MICROgram(s) Diskus 1 Dose(s) Inhalation two times a day  glucagon  Injectable 1 milliGRAM(s) IntraMuscular once  insulin glargine Injectable (LANTUS) 15 Unit(s) SubCutaneous at bedtime  insulin lispro (ADMELOG) corrective regimen sliding scale   SubCutaneous three times a day before meals  insulin lispro (ADMELOG) corrective regimen sliding scale   SubCutaneous at bedtime  insulin lispro Injectable (ADMELOG) 6 Unit(s) SubCutaneous three times a day before meals  iron sucrose IVPB 100 milliGRAM(s) IV Intermittent every 24 hours  losartan 100 milliGRAM(s) Oral daily  metoprolol succinate  milliGRAM(s) Oral daily  senna 2 Tablet(s) Oral at bedtime  spironolactone 25 milliGRAM(s) Oral daily    MEDICATIONS  (PRN):  acetaminophen     Tablet .. 650 milliGRAM(s) Oral every 6 hours PRN Temp greater or equal to 38C (100.4F), Mild Pain (1 - 3)  aluminum hydroxide/magnesium hydroxide/simethicone Suspension 30 milliLiter(s) Oral every 4 hours PRN Dyspepsia  dextrose Oral Gel 15 Gram(s) Oral once PRN Blood Glucose LESS THAN 70 milliGRAM(s)/deciliter  melatonin 3 milliGRAM(s) Oral at bedtime PRN Insomnia  ondansetron Injectable 4 milliGRAM(s) IV Push every 8 hours PRN Nausea and/or Vomiting      Vital Signs Last 24 Hrs  T(C): 36.9 (05 Jun 2025 15:57), Max: 36.9 (05 Jun 2025 11:11)  T(F): 98.4 (05 Jun 2025 15:57), Max: 98.4 (05 Jun 2025 11:11)  HR: 100 (05 Jun 2025 17:56) (84 - 100)  BP: 113/65 (05 Jun 2025 15:57) (113/65 - 122/79)  BP(mean): --  RR: 18 (05 Jun 2025 15:57) (17 - 18)  SpO2: 98% (05 Jun 2025 17:56) (84% - 98%)    Parameters below as of 05 Jun 2025 17:56  Patient On (Oxygen Delivery Method): nasal cannula        PHYSICAL EXAM:      Constitutional: in no distress    Eyes: normal    ENMT:    Neck: normal    Breasts:    Back:    Respiratory: normal    Cardiovascular: normal    Gastrointestinal: normal    Genitourinary:    Rectal:    Extremities:    Vascular:    Neurological:    Skin:    Lymph Nodes:    Musculoskeletal:    Psychiatric:            HEALTH ISSUES - PROBLEM Dx:            Assesment & Plan: Pt with intermittent rectal bleeding and iron deficiency. Resuscitate as indicated. Elective colonoscopy

## 2025-06-05 NOTE — CHART NOTE - NSCHARTNOTEFT_GEN_A_CORE
Patient will require mechanical ventilation; they are in chronic respiratory failure with hypoxia and hypercapnia secondary to COPD. BIPAP/ST (settings 14/5) have been considered and ruled out as it could not properly control CO2 levels. Patient has remained hypercapnic, recent ABG shows PCO2 level at 60mmhg on 6/2/2025. Therefore, patient will require guaranteed targeted tidal volumes with ability for daytime mouthpiece ventilation treatments not found on any homecare PAP devices. Without mechanical ventilation patient is at high risk for further clinical decline and hospital readmission.

## 2025-06-05 NOTE — PROGRESS NOTE ADULT - ASSESSMENT
This is a 59 year old F with PMH HTN, DM, COPD/asthma presenting with shortness of breath and cough x 1 day admitted for acute hypoxic respiratory failure with multifocal pneumonia.     #Acute hypercarbic hypoxic respiratory failure   #Community acquire multifocal pneumonia   #Asthma/COPD exacerbation   Hypoxia 2/2 multifocal pneumonia with asthma/COPD exacerbation   CTA negative to PE, revealed multifocal pneumonia   Received IV steroids, duonebs, CTX in ED  MRSA neg, Strep/legionella, mycoplasma negative will dc azithro  -sputum cx pending   COVID/Flu/RSV neg  C/w Duonebs standing  IV solumedrol 40mg daily, plan for 5 day course per pulm  C/w Advair BID   CTX for CAP  Pulm following, appreciate recs -->recommending home BIPAP, CM made aware     #Asthma/COPD  See above     #HTN  C/w home meds: metoprolol succ, amlodipine, losartan   Adjust as needed    DM2  A1C 6.7%  Per EMR, takes lantus 30units at bedtime, will start with Lantus 15 units HS and titrate as indicated   ISS, Accuchecks, Hypoglycemic protocol     Iron deficiency anemia   No signs of bleeding on exam   Iron panel, ferritin, b12, folate levels noted  Hgb 6.9 6/3, s/p1 unit PRBCs with appropriate increase in Hb  Start ferrous sulfate   Transfuse of hgb < 7   FOBT ordered but patient has not yet had a BM    HLD   Mild elevation is AST, will hold statin    Obesity   Lifestyle modifications     DVT ppx  lovenox

## 2025-06-05 NOTE — PROGRESS NOTE ADULT - SUBJECTIVE AND OBJECTIVE BOX
Patient is a 59y old  Female who presents with a chief complaint of Hypoxia (04 Jun 2025 07:34)      INTERVAL HPI/OVERNIGHT EVENTS:  Patient seen and examined bedside.   no overnight events   doing well   no complaints     REVIEW OF SYSTEMS: remaining ROS negative     MEDICATIONS  (STANDING):  albuterol/ipratropium for Nebulization 3 milliLiter(s) Nebulizer every 6 hours  amLODIPine   Tablet 10 milliGRAM(s) Oral daily  aspirin  chewable 81 milliGRAM(s) Oral daily  cefTRIAXone   IVPB 1000 milliGRAM(s) IV Intermittent every 24 hours  dextrose 5%. 1000 milliLiter(s) (50 mL/Hr) IV Continuous <Continuous>  dextrose 5%. 1000 milliLiter(s) (100 mL/Hr) IV Continuous <Continuous>  dextrose 50% Injectable 25 Gram(s) IV Push once  dextrose 50% Injectable 12.5 Gram(s) IV Push once  dextrose 50% Injectable 25 Gram(s) IV Push once  enoxaparin Injectable 40 milliGRAM(s) SubCutaneous every 24 hours  ferrous    sulfate 325 milliGRAM(s) Oral daily  fluticasone propionate/ salmeterol 250-50 MICROgram(s) Diskus 1 Dose(s) Inhalation two times a day  glucagon  Injectable 1 milliGRAM(s) IntraMuscular once  insulin glargine Injectable (LANTUS) 15 Unit(s) SubCutaneous at bedtime  insulin lispro (ADMELOG) corrective regimen sliding scale   SubCutaneous three times a day before meals  insulin lispro (ADMELOG) corrective regimen sliding scale   SubCutaneous at bedtime  losartan 100 milliGRAM(s) Oral daily  methylPREDNISolone sodium succinate Injectable 40 milliGRAM(s) IV Push daily  metoprolol succinate  milliGRAM(s) Oral daily  spironolactone 25 milliGRAM(s) Oral daily    MEDICATIONS  (PRN):  acetaminophen     Tablet .. 650 milliGRAM(s) Oral every 6 hours PRN Temp greater or equal to 38C (100.4F), Mild Pain (1 - 3)  aluminum hydroxide/magnesium hydroxide/simethicone Suspension 30 milliLiter(s) Oral every 4 hours PRN Dyspepsia  dextrose Oral Gel 15 Gram(s) Oral once PRN Blood Glucose LESS THAN 70 milliGRAM(s)/deciliter  melatonin 3 milliGRAM(s) Oral at bedtime PRN Insomnia  ondansetron Injectable 4 milliGRAM(s) IV Push every 8 hours PRN Nausea and/or Vomiting      Allergies    No Known Allergies    Intolerances        Vital Signs Last 24 Hrs  T(C): 36.9 (05 Jun 2025 15:57), Max: 36.9 (05 Jun 2025 11:11)  T(F): 98.4 (05 Jun 2025 15:57), Max: 98.4 (05 Jun 2025 11:11)  HR: 89 (05 Jun 2025 16:30) (84 - 95)  BP: 113/65 (05 Jun 2025 15:57) (113/65 - 122/79)  BP(mean): --  RR: 18 (05 Jun 2025 15:57) (17 - 18)  SpO2: 98% (05 Jun 2025 16:30) (84% - 98%)    Parameters below as of 05 Jun 2025 15:58  Patient On (Oxygen Delivery Method): nasal cannula  O2 Flow (L/min): 3          PHYSICAL EXAM:  GENERAL: NAD , no increased WOB  HEAD:  Atraumatic, Normocephalic  EYES: EOMI, PERRLA, conjunctiva and sclera clear  ENMT: No tonsillar erythema, exudates, or enlargement;   NECK: Supple, No JVD  NERVOUS SYSTEM:  Alert & Oriented X3, Good concentration; nonfocal   CHEST/LUNG: CTAB;  No rales, rhonchi, wheezing, or rubs  HEART: Regular rate and rhythm; No murmurs, rubs, or gallops  ABDOMEN: Soft, Nontender, Nondistended; Bowel sounds present  EXTREMITIES:  2+ Peripheral Pulses b/l, No clubbing, cyanosis, calf tenderness or edema b/l           LABS:                                   8.3    7.13  )-----------( 330      ( 05 Jun 2025 10:17 )             28.7   06-05    147[H]  |  112[H]  |  21  ----------------------------<  326[H]  4.6   |  29  |  1.05    Ca    8.9      05 Jun 2025 10:17  Phos  1.8     06-05  Mg     2.6     06-05            Urinalysis Basic - ( 03 Jun 2025 06:15 )    Color: x / Appearance: x / SG: x / pH: x  Gluc: 68 mg/dL / Ketone: x  / Bili: x / Urobili: x   Blood: x / Protein: x / Nitrite: x   Leuk Esterase: x / RBC: x / WBC x   Sq Epi: x / Non Sq Epi: x / Bacteria: x      Consultant(s) Notes Reveiwed [ x] Yes     Care Discussed with [x ] Consultants  [ x] Patient  [ ] Family  [x ] /   [ x] Other; RN  Care plan and all findings were discussed in detail with patient.  All questions and concerns addressed

## 2025-06-05 NOTE — PROGRESS NOTE ADULT - ASSESSMENT
This is a 59 year old F with PMH HTN, DM, COPD/asthma presenting with shortness of breath and cough which started yesterday. CT Chest with Multifocal PNA. Started on BIPAP 2/2 respiratory acidosis. Pulmonary consulted.    DX: Acute on chronic hypoxemic/hypercapnic respiratory failure, Multifocal PNA.     Reccs:  - Is on 4L NC (baseline 2L PRN) titrate as tolerated for goal >88%  - continue bipap at night   - strep, legionella, mycoplasma, flu, rsv covid, sputum cx negative   - C/W CAP coverage  - Can continue with solumedrol 40mg IVP qd for now. Aim for 5 day course of steroids but titrate base on clinical course.  - C/W advair, Standing Duonebs  - Rest of care as per primary team    Discussed with Dr. Bell    This is a 59 year old F with PMH HTN, DM, COPD/asthma presenting with shortness of breath and cough which started yesterday. CT Chest with Multifocal PNA. Started on BIPAP 2/2 respiratory acidosis. Pulmonary consulted.    DX: Acute on chronic hypoxemic/hypercapnic respiratory failure, Multifocal PNA.     Reccs:  - Is on 1L NC (baseline 2L PRN) titrate as tolerated for goal >88%  - continue bipap at night   - strep, legionella, mycoplasma, flu, rsv covid, sputum cx negative   - C/W CAP coverage  - Can continue with solumedrol 40mg IVP qd for now. Aim for 5 day course of steroids but titrate base on clinical course.  - C/W advair, Standing Duonebs  - Rest of care as per primary team    Discussed with Dr. Bell    This is a 59 year old F with PMH HTN, DM, COPD/asthma presenting with shortness of breath and cough which started yesterday. CT Chest with Multifocal PNA. Started on BIPAP 2/2 respiratory acidosis. Pulmonary consulted.    DX: Acute on chronic hypoxemic/hypercapnic respiratory failure, Multifocal PNA.     Reccs:  - Is on 1L NC (baseline 2L PRN) titrate as tolerated for goal >88%  - continue bipap at night   - strep, legionella, mycoplasma, flu, rsv covid, sputum cx negative   - C/W CAP coverage  - Can continue with solumedrol 40mg IVP qd for now. Aim for 5 day course of steroids but titrate base on clinical course.  - C/W advair, Standing Duonebs  - Rest of care as per primary team  - patient will require emchnical ventilation; they are in chronic respiratory failure with hypoxia and hypercapnea secondary to copd.Bipap has been ineffective and rled out due to elevated co2 levels.. patinet will require guranteed augmented taargeted TV. Without mechanical ventilation patient will continue with clinical decline and high risk for hospital readmission.   Discussed with Dr. Bell

## 2025-06-06 ENCOUNTER — TRANSCRIPTION ENCOUNTER (OUTPATIENT)
Age: 60
End: 2025-06-06

## 2025-06-06 VITALS — HEART RATE: 92 BPM | OXYGEN SATURATION: 91 %

## 2025-06-06 LAB
ANION GAP SERPL CALC-SCNC: 3 MMOL/L — LOW (ref 5–17)
BUN SERPL-MCNC: 17 MG/DL — SIGNIFICANT CHANGE UP (ref 7–23)
CALCIUM SERPL-MCNC: 8.6 MG/DL — SIGNIFICANT CHANGE UP (ref 8.5–10.1)
CHLORIDE SERPL-SCNC: 112 MMOL/L — HIGH (ref 96–108)
CO2 SERPL-SCNC: 30 MMOL/L — SIGNIFICANT CHANGE UP (ref 22–31)
CREAT SERPL-MCNC: 0.66 MG/DL — SIGNIFICANT CHANGE UP (ref 0.5–1.3)
EGFR: 100 ML/MIN/1.73M2 — SIGNIFICANT CHANGE UP
EGFR: 100 ML/MIN/1.73M2 — SIGNIFICANT CHANGE UP
GLUCOSE BLDC GLUCOMTR-MCNC: 198 MG/DL — HIGH (ref 70–99)
GLUCOSE BLDC GLUCOMTR-MCNC: 293 MG/DL — HIGH (ref 70–99)
GLUCOSE SERPL-MCNC: 177 MG/DL — HIGH (ref 70–99)
HCT VFR BLD CALC: 27.9 % — LOW (ref 34.5–45)
HGB BLD-MCNC: 7.8 G/DL — LOW (ref 11.5–15.5)
MAGNESIUM SERPL-MCNC: 2.6 MG/DL — SIGNIFICANT CHANGE UP (ref 1.6–2.6)
MCHC RBC-ENTMCNC: 25.1 PG — LOW (ref 27–34)
MCHC RBC-ENTMCNC: 28 G/DL — LOW (ref 32–36)
MCV RBC AUTO: 89.7 FL — SIGNIFICANT CHANGE UP (ref 80–100)
NRBC BLD AUTO-RTO: 4 /100 WBCS — HIGH (ref 0–0)
PHOSPHATE SERPL-MCNC: 2.4 MG/DL — LOW (ref 2.5–4.5)
PLATELET # BLD AUTO: 311 K/UL — SIGNIFICANT CHANGE UP (ref 150–400)
POTASSIUM SERPL-MCNC: 4.6 MMOL/L — SIGNIFICANT CHANGE UP (ref 3.5–5.3)
POTASSIUM SERPL-SCNC: 4.6 MMOL/L — SIGNIFICANT CHANGE UP (ref 3.5–5.3)
RBC # BLD: 3.11 M/UL — LOW (ref 3.8–5.2)
RBC # FLD: 18.4 % — HIGH (ref 10.3–14.5)
SODIUM SERPL-SCNC: 145 MMOL/L — SIGNIFICANT CHANGE UP (ref 135–145)
WBC # BLD: 7.73 K/UL — SIGNIFICANT CHANGE UP (ref 3.8–10.5)
WBC # FLD AUTO: 7.73 K/UL — SIGNIFICANT CHANGE UP (ref 3.8–10.5)

## 2025-06-06 PROCEDURE — 99233 SBSQ HOSP IP/OBS HIGH 50: CPT

## 2025-06-06 PROCEDURE — 99239 HOSP IP/OBS DSCHRG MGMT >30: CPT

## 2025-06-06 RX ORDER — ALBUTEROL SULFATE 2.5 MG/3ML
2 VIAL, NEBULIZER (ML) INHALATION
Qty: 1 | Refills: 0
Start: 2025-06-06

## 2025-06-06 RX ORDER — SODIUM PHOSPHATE,DIBASIC DIHYD
15 POWDER (GRAM) MISCELLANEOUS ONCE
Refills: 0 | Status: COMPLETED | OUTPATIENT
Start: 2025-06-06 | End: 2025-06-06

## 2025-06-06 RX ORDER — BUDESONIDE AND FORMOTEROL FUMARATE DIHYDRATE 80; 4.5 UG/1; UG/1
2 AEROSOL RESPIRATORY (INHALATION)
Qty: 1 | Refills: 0
Start: 2025-06-06

## 2025-06-06 RX ORDER — FERROUS SULFATE 137(45) MG
1 TABLET, EXTENDED RELEASE ORAL
Qty: 0 | Refills: 0 | DISCHARGE
Start: 2025-06-06

## 2025-06-06 RX ORDER — PREDNISONE 20 MG/1
2 TABLET ORAL
Qty: 6 | Refills: 0
Start: 2025-06-06 | End: 2025-06-08

## 2025-06-06 RX ORDER — BUDESONIDE AND FORMOTEROL FUMARATE DIHYDRATE 80; 4.5 UG/1; UG/1
2 AEROSOL RESPIRATORY (INHALATION)
Refills: 0 | DISCHARGE

## 2025-06-06 RX ORDER — SENNA 187 MG
2 TABLET ORAL
Qty: 0 | Refills: 0 | DISCHARGE
Start: 2025-06-06

## 2025-06-06 RX ORDER — TIOTROPIUM BROMIDE INHALATION SPRAY 3.12 UG/1
1 SPRAY, METERED RESPIRATORY (INHALATION)
Qty: 30 | Refills: 0
Start: 2025-06-06 | End: 2025-07-05

## 2025-06-06 RX ORDER — CYANOCOBALAMIN 1000 UG/ML
1 INJECTION INTRAMUSCULAR; SUBCUTANEOUS
Qty: 0 | Refills: 0 | DISCHARGE
Start: 2025-06-06

## 2025-06-06 RX ORDER — IPRATROPIUM BROMIDE AND ALBUTEROL SULFATE .5; 2.5 MG/3ML; MG/3ML
3 SOLUTION RESPIRATORY (INHALATION)
Qty: 360 | Refills: 0
Start: 2025-06-06 | End: 2025-07-05

## 2025-06-06 RX ADMIN — ENOXAPARIN SODIUM 40 MILLIGRAM(S): 100 INJECTION SUBCUTANEOUS at 11:06

## 2025-06-06 RX ADMIN — Medication 325 MILLIGRAM(S): at 11:06

## 2025-06-06 RX ADMIN — METOPROLOL SUCCINATE 100 MILLIGRAM(S): 50 TABLET, EXTENDED RELEASE ORAL at 05:49

## 2025-06-06 RX ADMIN — CYANOCOBALAMIN 1000 MICROGRAM(S): 1000 INJECTION INTRAMUSCULAR; SUBCUTANEOUS at 11:06

## 2025-06-06 RX ADMIN — INSULIN LISPRO 1: 100 INJECTION, SOLUTION INTRAVENOUS; SUBCUTANEOUS at 08:07

## 2025-06-06 RX ADMIN — IPRATROPIUM BROMIDE AND ALBUTEROL SULFATE 3 MILLILITER(S): .5; 2.5 SOLUTION RESPIRATORY (INHALATION) at 11:15

## 2025-06-06 RX ADMIN — Medication 25 MILLIGRAM(S): at 05:49

## 2025-06-06 RX ADMIN — AMLODIPINE BESYLATE 10 MILLIGRAM(S): 10 TABLET ORAL at 05:49

## 2025-06-06 RX ADMIN — Medication 1 DOSE(S): at 05:49

## 2025-06-06 RX ADMIN — IPRATROPIUM BROMIDE AND ALBUTEROL SULFATE 3 MILLILITER(S): .5; 2.5 SOLUTION RESPIRATORY (INHALATION) at 05:11

## 2025-06-06 RX ADMIN — PREDNISONE 40 MILLIGRAM(S): 20 TABLET ORAL at 05:49

## 2025-06-06 RX ADMIN — INSULIN LISPRO 6 UNIT(S): 100 INJECTION, SOLUTION INTRAVENOUS; SUBCUTANEOUS at 11:24

## 2025-06-06 RX ADMIN — LOSARTAN POTASSIUM 100 MILLIGRAM(S): 100 TABLET, FILM COATED ORAL at 05:49

## 2025-06-06 RX ADMIN — CEFTRIAXONE 100 MILLIGRAM(S): 500 INJECTION, POWDER, FOR SOLUTION INTRAMUSCULAR; INTRAVENOUS at 11:06

## 2025-06-06 RX ADMIN — INSULIN LISPRO 6 UNIT(S): 100 INJECTION, SOLUTION INTRAVENOUS; SUBCUTANEOUS at 08:08

## 2025-06-06 RX ADMIN — INSULIN LISPRO 3: 100 INJECTION, SOLUTION INTRAVENOUS; SUBCUTANEOUS at 11:24

## 2025-06-06 RX ADMIN — Medication 81 MILLIGRAM(S): at 11:05

## 2025-06-06 RX ADMIN — Medication 62.5 MILLIMOLE(S): at 11:41

## 2025-06-06 NOTE — DISCHARGE NOTE NURSING/CASE MANAGEMENT/SOCIAL WORK - PATIENT PORTAL LINK FT
You can access the FollowMyHealth Patient Portal offered by [facility] by registering at the following website: [email or web address]. By joining [employer]’s FollowMyHealth portal, you will also be able to view your health information using other applications (apps) compatible with our system.

## 2025-06-06 NOTE — DISCHARGE NOTE PROVIDER - NSDCFUSCHEDAPPT_GEN_ALL_CORE_FT
Rupinder Jamil  [facility]  [address]  Scheduled Appointment: 06/09/2025

## 2025-06-06 NOTE — PROGRESS NOTE ADULT - TIME BILLING
min spent reviewing patient's chart,  discussing in IDR, examining patient, discussing plan with patient and family and staff, reviewing consultant recommendations/communicating with consultants, writing progress note and placing orders.
Medical management as above, review of results/records, discussion with patient and primary team, documentation.
Lab test review, Radiology Review, Vitals review, Consultant review and discussion, Physical examination, IDR, Assessment and plan; Plan discussion with patient and family
Medical management as above, review of results/records, discussion with patient and primary team, documentation.
review of chart, blood work, vitals, medications, imaging, direct patient care. Time not inclusive of procedures performed on same day.
Medical management as above, review of results/records, discussion with patient and primary team, documentation.

## 2025-06-06 NOTE — DISCHARGE NOTE PROVIDER - NSDCMRMEDTOKEN_GEN_ALL_CORE_FT
aspirin 81 mg oral tablet: 1 tab(s) orally once a day  cyanocobalamin 1000 mcg oral tablet: 1 tab(s) orally once a day  ferrous sulfate 325 mg (65 mg elemental iron) oral tablet: 1 tab(s) orally every other day  Lantus Solostar Pen 100 units/mL subcutaneous solution: 30 unit(s) subcutaneous once a day (at bedtime)  losartan-hydrochlorothiazide 100 mg-25 mg oral tablet: 1 tab(s) orally once a day  metFORMIN 1000 mg oral tablet: 1 tab(s) orally 2 times a day  metoprolol succinate 100 mg oral capsule, extended release: 1 cap(s) orally once a day  Norvasc 10 mg oral tablet: 1 tab(s) orally once a day  senna leaf extract oral tablet: 2 tab(s) orally once a day (at bedtime)  spironolactone 25 mg oral tablet: 1 tab(s) orally once a day   Albuterol (Eqv-Proventil HFA) 90 mcg/inh inhalation aerosol: 2 puff(s) inhaled every 6 hours as needed for  shortness of breath and/or wheezing  aspirin 81 mg oral tablet: 1 tab(s) orally once a day  budesonide-formoterol 160 mcg-4.5 mcg/inh inhalation aerosol: 2 puff(s) inhaled 2 times a day  cyanocobalamin 1000 mcg oral tablet: 1 tab(s) orally once a day  ferrous sulfate 325 mg (65 mg elemental iron) oral tablet: 1 tab(s) orally every other day  ipratropium-albuterol 0.5 mg-2.5 mg/3 mL inhalation solution: 3 milliliter(s) inhaled every 6 hours  Lantus Solostar Pen 100 units/mL subcutaneous solution: 30 unit(s) subcutaneous once a day (at bedtime)  losartan-hydrochlorothiazide 100 mg-25 mg oral tablet: 1 tab(s) orally once a day  metFORMIN 1000 mg oral tablet: 1 tab(s) orally 2 times a day  metoprolol succinate 100 mg oral capsule, extended release: 1 cap(s) orally once a day  Norvasc 10 mg oral tablet: 1 tab(s) orally once a day  predniSONE 20 mg oral tablet: 2 tab(s) orally once a day  senna leaf extract oral tablet: 2 tab(s) orally once a day (at bedtime)  spironolactone 25 mg oral tablet: 1 tab(s) orally once a day  tiotropium 18 mcg inhalation capsule: 1 cap(s) inhaled once a day (at bedtime)   Albuterol (Eqv-Proventil HFA) 90 mcg/inh inhalation aerosol: 2 puff(s) inhaled every 6 hours as needed for  shortness of breath and/or wheezing  aspirin 81 mg oral tablet: 1 tab(s) orally once a day  budesonide-formoterol 160 mcg-4.5 mcg/inh inhalation aerosol: 2 puff(s) inhaled 2 times a day  cyanocobalamin 1000 mcg oral tablet: 1 tab(s) orally once a day  ferrous sulfate 325 mg (65 mg elemental iron) oral tablet: 1 tab(s) orally every other day  ipratropium-albuterol 0.5 mg-2.5 mg/3 mL inhalation solution: 3 milliliter(s) inhaled every 6 hours  Lantus Solostar Pen 100 units/mL subcutaneous solution: 30 unit(s) subcutaneous once a day (at bedtime)  losartan-hydrochlorothiazide 100 mg-25 mg oral tablet: 1 tab(s) orally once a day  metFORMIN 1000 mg oral tablet: 1 tab(s) orally 2 times a day  metoprolol succinate 100 mg oral capsule, extended release: 1 cap(s) orally once a day  nebulizer machine: per patient&#x27;s insurance  Norvasc 10 mg oral tablet: 1 tab(s) orally once a day  predniSONE 20 mg oral tablet: 2 tab(s) orally once a day  senna leaf extract oral tablet: 2 tab(s) orally once a day (at bedtime)  spironolactone 25 mg oral tablet: 1 tab(s) orally once a day  tiotropium 18 mcg inhalation capsule: 1 cap(s) inhaled once a day (at bedtime)   Albuterol (Eqv-Proventil HFA) 90 mcg/inh inhalation aerosol: 2 puff(s) inhaled every 6 hours as needed for  shortness of breath and/or wheezing  aspirin 81 mg oral tablet: 1 tab(s) orally once a day  budesonide-formoterol 160 mcg-4.5 mcg/inh inhalation aerosol: 2 puff(s) inhaled 2 times a day  cyanocobalamin 1000 mcg oral tablet: 1 tab(s) orally once a day  ferrous sulfate 325 mg (65 mg elemental iron) oral tablet: 1 tab(s) orally every other day  ipratropium-albuterol 0.5 mg-2.5 mg/3 mL inhalation solution: 3 milliliter(s) inhaled every 6 hours  Lantus Solostar Pen 100 units/mL subcutaneous solution: 30 unit(s) subcutaneous once a day (at bedtime)  losartan-hydrochlorothiazide 100 mg-25 mg oral tablet: 1 tab(s) orally once a day  metFORMIN 1000 mg oral tablet: 1 tab(s) orally 2 times a day  metoprolol succinate 100 mg oral capsule, extended release: 1 cap(s) orally once a day  nebulizer machine: per patient&#x27;s insurance  Nebulizer machine: 1 nebulizer machine  Norvasc 10 mg oral tablet: 1 tab(s) orally once a day  predniSONE 20 mg oral tablet: 2 tab(s) orally once a day  senna leaf extract oral tablet: 2 tab(s) orally once a day (at bedtime)  spironolactone 25 mg oral tablet: 1 tab(s) orally once a day  tiotropium 18 mcg inhalation capsule: 1 cap(s) inhaled once a day (at bedtime)

## 2025-06-06 NOTE — DISCHARGE NOTE PROVIDER - NSFOLLOWUPCLINICS_GEN_ALL_ED_FT
[facility]  Pulmonary  [place]   Phone:   Fax:   Follow Up Time: 1 week    [facility]  Pulmonology  [address] [place] [address]  Phone: [phone]  Fax:   Follow Up Time: 2 weeks

## 2025-06-06 NOTE — PROGRESS NOTE ADULT - NS ATTEND AMEND GEN_ALL_CORE FT
59F ex-smoker w/ HTN, HLD, COPD/asthma, on chronic O2 as needed. Admitted w/ acute hypercapnic respiratory failure requiring BiPAP in setting of multifocal pneumonia and COPD exacerbation.     - today pt is feeling much better and lungs are clear today   - down to 2LNC, SpO2 98%  - please wean down FiO2 as tolerated to maintain SpO2 >88%   - using BiPAP 14/5 nightly, continue PRN and qhs  - remains ceftriaxone for CAP, infectious workup NGTD  - s/p solumedrol and transitioned to prednisone - can complete another 2 days for 6-7 day course  - continue advair  - duonebs q6  - PT, OOB to chair  - pt would benefit from home BiPAP given COPD and hypercapnia - approved and to be delivered today  - remainder of care per primary team  - d/c planning, pt should follow-up with pulmonary upon d/c    Discussed w/ Dr Walker
59F ex-smoker w/ HTN, HLD, COPD/asthma, on chronic O2 as needed. Admitted w/ acute hypercapnic respiratory failure requiring BiPAP in setting of multifocal pneumonia and COPD exacerbation.     - pt feeling better overall  - Noted to be on 6LNC, SpO2 98%, decreased to 4LNC w/ adequate SpO2  - please wean down FiO2 as tolerated to maintain SpO2 >88%   - using BiPAP 14/5 nightly, continue PRN and qhs  - continue ceftriaxone for CAP, infectious workup NGTD  - continue solumedrol for COPD exacerbation - plan for 5-7 day course  - continue advair  - duonebs q6  - PT, OOB to chair  - pt would benefit from home BiPAP given COPD and hypercapnia   - remainder of care per primary team
59F ex-smoker w/ HTN, HLD, COPD/asthma, on chronic O2 as needed. Admitted w/ acute hypercapnic respiratory failure requiring BiPAP in setting of multifocal pneumonia and COPD exacerbation.     - pt continuing to feel better overall  - down to 3LNC, SpO2 98%, decreased to 1LNC w/ adequate SpO2   - please wean down FiO2 as tolerated to maintain SpO2 >88%   - using BiPAP 14/5 nightly, continue PRN and qhs  - continue ceftriaxone for CAP, infectious workup NGTD  - continue solumedrol for COPD exacerbation - plan for 5-7 day course  - continue advair  - duonebs q6  - PT, OOB to chair  - pt would benefit from home BiPAP given COPD and hypercapnia   - remainder of care per primary team
pt seen and examined at bedside with NP    weaned off bipap this morning  on 4L NC  still with SOB, cough  lung exam with bilateral rhonchi and wheeze  afebrile  lab work this AM with Hb 6.9: 1 unit pRBC ordered  denies melena however reports she has been having intermittent noted of blood on tissue paper when wiping after BMs at home  no abdominal pain      59F ex smoker 1/2 PPD x 25 years quit 2009 PMH HTN, HLD, DM, COPD and asthma (follows at [facility]: reports being maintained on Symbicort, prn albuterol, montelukast and uses home O2 2L prn) presents for cough, wheeze, SOB x1 week with worsening symptoms the day prior to presentation. States she started using her O2 at home due to SOB and has been using her albuterol daily for past 2-3 days. Denies fever and chills. No recent travel. Found to be hypoxic on 4L to the 60s. Blood gas 7.3/56/43/28/66%. CXR with bilateral infiltrates. CTA with bilateral opacities consistent with multifocal PNA. WBC 12.6, proBNP 4823. Pulmonary consulted for management    DX: acute on chronic hypoxic respiratory failure, acute hypercarbic respiratory failure, multifocal PNA, acute COPD / asthma exacerbation    - pt on BiPAP IPAP 10 and EPAP 5 with FIO2: 50%  - repeat blood gas on BiPAP with ABG 7.26/60/103/27/98%. bipap settings were adjusted with IPAP increased  - weaned off bipap, now on 4L NC  - cont nocturnal BiPAP with prn during the day  - goal to maintain O2 sat > 90%  - wean down FIO2 as tolerates  - pt awake and alert  - repeat blood gas in AM  - CTA chest reviewed and with multifocal bilateral opacities  - treat for CAP: azithromycin and ceftriaxone  - flu/covid/rsv negative  - follow up sputum cx  - follow up strep Ag, legionella, mycoplasma  - cont with solumedrol 40mg daily for copd/asthma exacerbation  - duonebs q6 hours  - cont montelukast  - on symbicort at home (hospital interchange for symbicort with advair)  - manual chest PT performed at bedside to facilitate secretion mobilization  - anemic, work up per medical team  - remainder of care per medicine

## 2025-06-06 NOTE — PROGRESS NOTE ADULT - ASSESSMENT
This is a 59 year old F with PMH HTN, DM, COPD/asthma presenting with shortness of breath and cough which started yesterday. CT Chest with Multifocal PNA. Started on BIPAP 2/2 respiratory acidosis. Pulmonary consulted.    DX: Acute on chronic hypoxemic/hypercapnic respiratory failure, Multifocal PNA.  This is a 59 year old F with PMH HTN, DM, COPD/asthma presenting with shortness of breath and cough which started yesterday. CT Chest with Multifocal PNA. Started on BIPAP 2/2 respiratory acidosis. Pulmonary consulted.    DX: Acute on chronic hypoxemic/hypercapnic respiratory failure, Multifocal PNA.     - Currently on 2LNC, SpO2 98%  - continue to titrate down FiO2 as tolerated to maintain SpO2 >88%   - using BiPAP 14/5 nightly, continue PRN and qhs  - remains ceftriaxone for CAP, infectious workup NGTD  - s/p solumedrol and transitioned to prednisone - can complete another 2 days for 6-7 day course  - continue advair  - duonebs q6  - PT, OOB to chair  - pt would benefit from home BiPAP given COPD and hypercapnia - approved and to be delivered today  - remainder of care per primary team  - d/c planning, pt should follow-up with pulmonary upon d/c  - Rest of care as per primary team    Discussed with Dr. Bell

## 2025-06-06 NOTE — DISCHARGE NOTE NURSING/CASE MANAGEMENT/SOCIAL WORK - FINANCIAL ASSISTANCE
[facility] provides services at a reduced cost to those who are determined to be eligible through [facility]’s financial assistance program. Information regarding [facility]’s financial assistance program can be found by going to [email or web address] or by calling [phone].

## 2025-06-06 NOTE — DISCHARGE NOTE NURSING/CASE MANAGEMENT/SOCIAL WORK - NSDCFUADDAPPT_GEN_ALL_CORE_FT
It is important to see your primary physician as well as other necessary consultants within the next week to perform a comprehensive medical review.  Call their offices for an appointment as soon as you leave the hospital.  If you do not have a primary physician or cant reach him/her, contact the [facility] Physician Referral Service at [phone].  Your medical issues appear to be stable at this time, but if your symptoms recur or worsen, contact your physicians and/or return to the hospital if necessary.  If you encounter any issues or questions with your medication, call your physicians before stopping the medication.     APPTS ARE READY TO BE MADE: [X] YES    Best Family or Patient Contact (if needed):    Additional Information about above appointments (if needed):    1: primary care doctor within 1 week  2: pul home   3:     Other comments or requests:

## 2025-06-06 NOTE — PROGRESS NOTE ADULT - SUBJECTIVE AND OBJECTIVE BOX
Interval Events:    REVIEW OF SYSTEMS:  All negative unless listed within interval HPI     OBJECTIVE:  Vital Signs Last 24 Hrs  T(C): 36.7 (06 Jun 2025 04:19), Max: 36.9 (05 Jun 2025 11:11)  T(F): 98 (06 Jun 2025 04:19), Max: 98.4 (05 Jun 2025 11:11)  HR: 89 (06 Jun 2025 08:13) (78 - 100)  BP: 122/74 (06 Jun 2025 04:19) (113/65 - 129/86)  BP(mean): --  ABP: --  ABP(mean): --  RR: 18 (06 Jun 2025 04:19) (18 - 18)  SpO2: 93% (06 Jun 2025 08:13) (84% - 99%)    O2 Parameters below as of 06 Jun 2025 07:00  Patient On (Oxygen Delivery Method): nasal cannula  O2 Flow (L/min): 2            06-05 @ 07:01  -  06-06 @ 07:00  --------------------------------------------------------  IN: 1190 mL / OUT: 0 mL / NET: 1190 mL      CAPILLARY BLOOD GLUCOSE      POCT Blood Glucose.: 198 mg/dL (06 Jun 2025 07:50)      PHYSICAL EXAM:  General: Awake, alert, oriented X 3  Respiratory:  no wheezing, rhonchi or rales. no increased work of breathing. no accessory muscle use   Cardiovascular: RRR  Abdomen: Soft, non-tender, non-distended. +BS    HOSPITAL MEDICATIONS:  aspirin  chewable 81 milliGRAM(s) Oral daily  enoxaparin Injectable 40 milliGRAM(s) SubCutaneous every 24 hours    cefTRIAXone   IVPB 1000 milliGRAM(s) IV Intermittent every 24 hours    amLODIPine   Tablet 10 milliGRAM(s) Oral daily  losartan 100 milliGRAM(s) Oral daily  metoprolol succinate  milliGRAM(s) Oral daily  spironolactone 25 milliGRAM(s) Oral daily    dextrose 50% Injectable 25 Gram(s) IV Push once  dextrose 50% Injectable 12.5 Gram(s) IV Push once  dextrose 50% Injectable 25 Gram(s) IV Push once  dextrose Oral Gel 15 Gram(s) Oral once PRN  glucagon  Injectable 1 milliGRAM(s) IntraMuscular once  insulin glargine Injectable (LANTUS) 15 Unit(s) SubCutaneous at bedtime  insulin lispro (ADMELOG) corrective regimen sliding scale   SubCutaneous three times a day before meals  insulin lispro (ADMELOG) corrective regimen sliding scale   SubCutaneous at bedtime  insulin lispro Injectable (ADMELOG) 6 Unit(s) SubCutaneous three times a day before meals  predniSONE   Tablet 40 milliGRAM(s) Oral daily    albuterol/ipratropium for Nebulization 3 milliLiter(s) Nebulizer every 6 hours  fluticasone propionate/ salmeterol 250-50 MICROgram(s) Diskus 1 Dose(s) Inhalation two times a day    acetaminophen     Tablet .. 650 milliGRAM(s) Oral every 6 hours PRN  melatonin 3 milliGRAM(s) Oral at bedtime PRN  ondansetron Injectable 4 milliGRAM(s) IV Push every 8 hours PRN    aluminum hydroxide/magnesium hydroxide/simethicone Suspension 30 milliLiter(s) Oral every 4 hours PRN  senna 2 Tablet(s) Oral at bedtime        cyanocobalamin 1000 MICROGram(s) Oral daily  dextrose 5%. 1000 milliLiter(s) IV Continuous <Continuous>  dextrose 5%. 1000 milliLiter(s) IV Continuous <Continuous>  ferrous    sulfate 325 milliGRAM(s) Oral daily  iron sucrose IVPB 100 milliGRAM(s) IV Intermittent every 24 hours            LABS:                        7.8    7.73  )-----------( 311      ( 06 Jun 2025 07:50 )             27.9     Hgb Trend: 7.8<--, 8.3<--, 7.7<--, 6.9<--, 7.5<--  06-05    147[H]  |  112[H]  |  21  ----------------------------<  326[H]  4.6   |  29  |  1.05    Ca    8.9      05 Jun 2025 10:17  Phos  1.8     06-05  Mg     2.6     06-05      Creatinine Trend: 1.05<--, 1.34<--, 0.66<--, 0.86<--    Urinalysis Basic - ( 05 Jun 2025 10:17 )    Color: x / Appearance: x / SG: x / pH: x  Gluc: 326 mg/dL / Ketone: x  / Bili: x / Urobili: x   Blood: x / Protein: x / Nitrite: x   Leuk Esterase: x / RBC: x / WBC x   Sq Epi: x / Non Sq Epi: x / Bacteria: x            MICROBIOLOGY:     Culture - Sputum (06.03.25 @ 11:00)    Gram Stain:   Rare polymorphonuclear leukocytes per low power field  Rare Squamous epithelial cells per low power field  Few Gram Positive Rods per oil power field  Rare Gram Negative Rods per oil power field  Rare Gram Positive Cocci in Clusters per oil power field   Specimen Source: Sputum Sputum   Culture Results:   Commensal brittanie consistent with body site    Culture - Blood (06.02.25 @ 15:20)    Specimen Source: Blood Blood   Culture Results:   No growth at 72 Hours    Culture - Blood (06.02.25 @ 15:00)    Specimen Source: Blood Blood   Culture Results:   No growth at 72 Hours    S. pneumoniae &amp; L. pneumophila Ag, Urine (06.03.25 @ 16:00)    Streptococcus pneumoniae antigen: Negative:   Legionella pneumophila antigen: Negative:     Mycoplasma Pneumoniae IgM Antibody (06.02.25 @ 17:30)    Mycoplasma IgM AB: 0.48 Index   Mycoplasma: Negative: Method BUTCH           Interpretation:                                             Index                  Negative              <=0.90                  Equivocal            0.91-1.09                  Positive                >=1.10    MRSA/MSSA PCR (06.02.25 @ 20:30)    MRSA PCR Result.: NotDetec:    Staph aureus PCR Result: Detected    FluA/FluB/RSV/COVID PCR (06.02.25 @ 20:30)    SARS-CoV-2 Result: NotDetec:    Influenza A Result: NotDetec: For Emergency Use Authorization   Influenza B Result: NotDetec: For Emergency Use Authorization   Resp Syn Virus Result: NotDetec: For Emergency Use Authorization   Source Respiratory: Nasopharyngeal          RADIOLOGY:  [x ] Reviewed Interval Events: No acute events. on 2 LNC with SpO2 stable. Pending Home NIV delivery for dispo. Denies new or worsening complaints.     REVIEW OF SYSTEMS:  All negative unless listed within interval HPI     OBJECTIVE:  Vital Signs Last 24 Hrs  T(C): 36.7 (06 Jun 2025 04:19), Max: 36.9 (05 Jun 2025 11:11)  T(F): 98 (06 Jun 2025 04:19), Max: 98.4 (05 Jun 2025 11:11)  HR: 89 (06 Jun 2025 08:13) (78 - 100)  BP: 122/74 (06 Jun 2025 04:19) (113/65 - 129/86)  BP(mean): --  ABP: --  ABP(mean): --  RR: 18 (06 Jun 2025 04:19) (18 - 18)  SpO2: 93% (06 Jun 2025 08:13) (84% - 99%)    O2 Parameters below as of 06 Jun 2025 07:00  Patient On (Oxygen Delivery Method): nasal cannula  O2 Flow (L/min): 2            06-05 @ 07:01  -  06-06 @ 07:00  --------------------------------------------------------  IN: 1190 mL / OUT: 0 mL / NET: 1190 mL      CAPILLARY BLOOD GLUCOSE      POCT Blood Glucose.: 198 mg/dL (06 Jun 2025 07:50)      PHYSICAL EXAM:  General: Awake, alert, oriented X 3  Respiratory:  no wheezing, rhonchi or rales. no increased work of breathing. no accessory muscle use   Cardiovascular: RRR  Abdomen: Soft, non-tender, non-distended. +BS    HOSPITAL MEDICATIONS:  aspirin  chewable 81 milliGRAM(s) Oral daily  enoxaparin Injectable 40 milliGRAM(s) SubCutaneous every 24 hours    cefTRIAXone   IVPB 1000 milliGRAM(s) IV Intermittent every 24 hours    amLODIPine   Tablet 10 milliGRAM(s) Oral daily  losartan 100 milliGRAM(s) Oral daily  metoprolol succinate  milliGRAM(s) Oral daily  spironolactone 25 milliGRAM(s) Oral daily    dextrose 50% Injectable 25 Gram(s) IV Push once  dextrose 50% Injectable 12.5 Gram(s) IV Push once  dextrose 50% Injectable 25 Gram(s) IV Push once  dextrose Oral Gel 15 Gram(s) Oral once PRN  glucagon  Injectable 1 milliGRAM(s) IntraMuscular once  insulin glargine Injectable (LANTUS) 15 Unit(s) SubCutaneous at bedtime  insulin lispro (ADMELOG) corrective regimen sliding scale   SubCutaneous three times a day before meals  insulin lispro (ADMELOG) corrective regimen sliding scale   SubCutaneous at bedtime  insulin lispro Injectable (ADMELOG) 6 Unit(s) SubCutaneous three times a day before meals  predniSONE   Tablet 40 milliGRAM(s) Oral daily    albuterol/ipratropium for Nebulization 3 milliLiter(s) Nebulizer every 6 hours  fluticasone propionate/ salmeterol 250-50 MICROgram(s) Diskus 1 Dose(s) Inhalation two times a day    acetaminophen     Tablet .. 650 milliGRAM(s) Oral every 6 hours PRN  melatonin 3 milliGRAM(s) Oral at bedtime PRN  ondansetron Injectable 4 milliGRAM(s) IV Push every 8 hours PRN    aluminum hydroxide/magnesium hydroxide/simethicone Suspension 30 milliLiter(s) Oral every 4 hours PRN  senna 2 Tablet(s) Oral at bedtime        cyanocobalamin 1000 MICROGram(s) Oral daily  dextrose 5%. 1000 milliLiter(s) IV Continuous <Continuous>  dextrose 5%. 1000 milliLiter(s) IV Continuous <Continuous>  ferrous    sulfate 325 milliGRAM(s) Oral daily  iron sucrose IVPB 100 milliGRAM(s) IV Intermittent every 24 hours            LABS:                        7.8    7.73  )-----------( 311      ( 06 Jun 2025 07:50 )             27.9     Hgb Trend: 7.8<--, 8.3<--, 7.7<--, 6.9<--, 7.5<--  06-05    147[H]  |  112[H]  |  21  ----------------------------<  326[H]  4.6   |  29  |  1.05    Ca    8.9      05 Jun 2025 10:17  Phos  1.8     06-05  Mg     2.6     06-05      Creatinine Trend: 1.05<--, 1.34<--, 0.66<--, 0.86<--    Urinalysis Basic - ( 05 Jun 2025 10:17 )    Color: x / Appearance: x / SG: x / pH: x  Gluc: 326 mg/dL / Ketone: x  / Bili: x / Urobili: x   Blood: x / Protein: x / Nitrite: x   Leuk Esterase: x / RBC: x / WBC x   Sq Epi: x / Non Sq Epi: x / Bacteria: x            MICROBIOLOGY:     Culture - Sputum (06.03.25 @ 11:00)    Gram Stain:   Rare polymorphonuclear leukocytes per low power field  Rare Squamous epithelial cells per low power field  Few Gram Positive Rods per oil power field  Rare Gram Negative Rods per oil power field  Rare Gram Positive Cocci in Clusters per oil power field   Specimen Source: Sputum Sputum   Culture Results:   Commensal brittanie consistent with body site    Culture - Blood (06.02.25 @ 15:20)    Specimen Source: Blood Blood   Culture Results:   No growth at 72 Hours    Culture - Blood (06.02.25 @ 15:00)    Specimen Source: Blood Blood   Culture Results:   No growth at 72 Hours    S. pneumoniae &amp; L. pneumophila Ag, Urine (06.03.25 @ 16:00)    Streptococcus pneumoniae antigen: Negative:   Legionella pneumophila antigen: Negative:     Mycoplasma Pneumoniae IgM Antibody (06.02.25 @ 17:30)    Mycoplasma IgM AB: 0.48 Index   Mycoplasma: Negative: Method BUTCH           Interpretation:                                             Index                  Negative              <=0.90                  Equivocal            0.91-1.09                  Positive                >=1.10    MRSA/MSSA PCR (06.02.25 @ 20:30)    MRSA PCR Result.: NotDetec:    Staph aureus PCR Result: Detected    FluA/FluB/RSV/COVID PCR (06.02.25 @ 20:30)    SARS-CoV-2 Result: NotDetec:    Influenza A Result: NotDetec: For Emergency Use Authorization   Influenza B Result: NotDetec: For Emergency Use Authorization   Resp Syn Virus Result: NotDetec: For Emergency Use Authorization   Source Respiratory: Nasopharyngeal          RADIOLOGY:  [x ] Reviewed

## 2025-06-06 NOTE — DISCHARGE NOTE PROVIDER - NSDCCPCAREPLAN_GEN_ALL_CORE_FT
PRINCIPAL DISCHARGE DIAGNOSIS  Diagnosis: COPD exacerbation  Assessment and Plan of Treatment:       SECONDARY DISCHARGE DIAGNOSES  Diagnosis: Multifocal pneumonia  Assessment and Plan of Treatment:

## 2025-06-06 NOTE — DISCHARGE NOTE PROVIDER - HOSPITAL COURSE
59 year old F with PMH HTN, DM, COPD/asthma presenting with shortness of breath and cough x 1 day admitted for acute hypoxic respiratory failure with multifocal pneumonia.     Vital Signs Last 24 Hrs  T(C): 37.2 (06 Jun 2025 10:45), Max: 37.2 (06 Jun 2025 10:45)  T(F): 99 (06 Jun 2025 10:45), Max: 99 (06 Jun 2025 10:45)  HR: 92 (06 Jun 2025 13:03) (78 - 100)  BP: 113/73 (06 Jun 2025 10:45) (113/65 - 129/86)  RR: 18 (06 Jun 2025 10:45) (18 - 18)  SpO2: 91% (06 Jun 2025 13:03) (84% - 99%)  Parameters below as of 06 Jun 2025 13:03  Patient On (Oxygen Delivery Method): nasal cannula  O2 Flow (L/min): 2      #Acute hypercarbic hypoxic respiratory failure   #Community acquire multifocal pneumonia   #Asthma/COPD exacerbation , improved   Hypoxia 2/2 multifocal pneumonia with asthma/COPD exacerbation   CTA negative to PE, revealed multifocal pneumonia   Received IV steroids, duonebs, CTX in ED  MRSA neg, Strep/legionella, mycoplasma negative will dc azithro  COVID/Flu/RSV neg  IV solumedrol 40mg daily, plan for 5 day course per pulm, switch to prednisone for 2 more days   C/w Advair BID   CTX for CAP>>completed 5 day course   Pulm following, appreciate recs -->recommending home BIPAP, CM made aware >>BIPAP to be delivered to patient's home today     #Asthma/COPD  See above     #HTN  C/w home meds     DM2  A1C 6.7%  continue with home meds   avoid hypoglycemia        Iron deficiency anemia   No signs of bleeding on exam   Iron panel, ferritin, b12, folate levels noted  Hgb 6.9 6/3, s/p1 unit PRBCs with appropriate increase in Hb  Start ferrous sulfate   Transfuse of hgb < 7   no brbpr or melena     HLD   resume statin     Obesity BMI 36.4   pt counselled on diet and lifestyle modification, gradual weight loss, and activity.        Discharge time : 40 min   RETURN PARAMETERS DISCUSSED WITH PATIENT, PATIENT EXPRESSED UNDERSTANDING AND IS AGREEABLE. DISCUSSED WITH PATIENT ON REFRAINING FROM DRIVING UNTIL FOLLOW-UP/ CLEARED BY PMD. PATIENT EXPRESSED UNDERSTANDING.   Care plan and all findings were discussed in detail with patient.  All questions and concerns addressed    59 year old F with PMH HTN, DM, COPD/asthma presenting with shortness of breath and cough x 1 day admitted for acute hypoxic respiratory failure with multifocal pneumonia.     Vital Signs Last 24 Hrs  T(C): 37.2 (06 Jun 2025 10:45), Max: 37.2 (06 Jun 2025 10:45)  T(F): 99 (06 Jun 2025 10:45), Max: 99 (06 Jun 2025 10:45)  HR: 92 (06 Jun 2025 13:03) (78 - 100)  BP: 113/73 (06 Jun 2025 10:45) (113/65 - 129/86)  RR: 18 (06 Jun 2025 10:45) (18 - 18)  SpO2: 91% (06 Jun 2025 13:03) (84% - 99%)  Parameters below as of 06 Jun 2025 13:03  Patient On (Oxygen Delivery Method): nasal cannula  O2 Flow (L/min): 2  GENERAL: NAD , no increased WOB  NERVOUS SYSTEM:  Alert & Oriented X3, Good concentration; nonfocal   CHEST/LUNG: CTAB;  No rales, rhonchi, wheezing, or rubs  HEART: Regular rate and rhythm; No murmurs, rubs, or gallops  ABDOMEN: Soft, Nontender, Nondistended; Bowel sounds present  EXTREMITIES:  2+ Peripheral Pulses b/l, No clubbing, cyanosis, calf tenderness or edema b/l     #Acute hypercarbic hypoxic respiratory failure   #Community acquire multifocal pneumonia   #Asthma/COPD exacerbation , improved   2/2 multifocal pneumonia with asthma/COPD exacerbation   leukocytosis normalized   CTA negative to PE, revealed multifocal pneumonia   Received IV steroids, duonebs, CTX in ED  MRSA neg, Strep/legionella, mycoplasma negative,  dc azithro  COVID/Flu/RSV neg  Blood Cx --NGTD   sputum Cx --normal brittanie   IV solumedrol 40mg daily, plan for 5 day course per pulm, switched to prednisone for 2 more days   CTX for CAP>>completed 5 day course   Pulm following, appreciate recs -->recommending home BIPAP, CM made aware >>BIPAP to be delivered to patient's home today   continue with inhalers   patient at baseline O2 requirements, doing well     #Asthma/COPD  See above     #HTN  C/w home meds     DM2  A1C 6.7%  continue with home meds   avoid hypoglycemia        Iron deficiency anemia   normocytic anemia   No signs of bleeding or bruising  on exam   Iron panel, ferritin, b12, folate levels noted  Hgb 6.9 6/3, s/p1 unit PRBCs with appropriate increase in Hb  continue with ferrous sulfate   Transfuse of hgb < 7   no brbpr or melena   outpatient anemia work-up     HLD   resume statin     hypernatremia , normalized     hypophosphatemia , repleted     Obesity BMI 36.4   pt counselled on diet and lifestyle modification, gradual weight loss, and activity.     Exophytic right upper pole renal cyst.  --outpatient follow-up        Discharge time : 40 min   RETURN PARAMETERS DISCUSSED WITH PATIENT, PATIENT EXPRESSED UNDERSTANDING AND IS AGREEABLE. DISCUSSED WITH PATIENT ON REFRAINING FROM DRIVING UNTIL FOLLOW-UP/ CLEARED BY PMD. PATIENT EXPRESSED UNDERSTANDING.   Care plan and all findings were discussed in detail with patient.  All questions and concerns addressed

## 2025-06-06 NOTE — DISCHARGE NOTE PROVIDER - NSDCFUADDAPPT_GEN_ALL_CORE_FT
It is important to see your primary physician as well as other necessary consultants within the next week to perform a comprehensive medical review.  Call their offices for an appointment as soon as you leave the hospital.  If you do not have a primary physician or cant reach him/her, contact the [facility] Physician Referral Service at [phone].  Your medical issues appear to be stable at this time, but if your symptoms recur or worsen, contact your physicians and/or return to the hospital if necessary.  If you encounter any issues or questions with your medication, call your physicians before stopping the medication.     APPTS ARE READY TO BE MADE: [X] YES    Best Family or Patient Contact (if needed):    Additional Information about above appointments (if needed):    1: primary care doctor within 1 week  2: pul home   3:     Other comments or requests:    It is important to see your primary physician as well as other necessary consultants within the next week to perform a comprehensive medical review.  Call their offices for an appointment as soon as you leave the hospital.  If you do not have a primary physician or cant reach him/her, contact the [facility] Physician Referral Service at [phone].  Your medical issues appear to be stable at this time, but if your symptoms recur or worsen, contact your physicians and/or return to the hospital if necessary.  If you encounter any issues or questions with your medication, call your physicians before stopping the medication.     APPTS ARE READY TO BE MADE: [X] YES    Best Family or Patient Contact (if needed):    Additional Information about above appointments (if needed):    1: primary care doctor within 1 week  2: pulm home   3:     Other comments or requests:       PCP-Provided patient with provider referral information, however patient prefers to schedule the appointments on their own.     HOME PULM -Appointment was scheduled in [facility] on 6/9 at 5pm

## 2025-06-06 NOTE — PROGRESS NOTE ADULT - PROVIDER SPECIALTY LIST ADULT
Hospitalist
Hospitalist
Pulmonology
Gastroenterology
Pulmonology
Hospitalist
to return home

## 2025-06-08 LAB
CULTURE RESULTS: SIGNIFICANT CHANGE UP
CULTURE RESULTS: SIGNIFICANT CHANGE UP
SPECIMEN SOURCE: SIGNIFICANT CHANGE UP
SPECIMEN SOURCE: SIGNIFICANT CHANGE UP

## 2025-06-09 ENCOUNTER — APPOINTMENT (OUTPATIENT)
Dept: PULMONOLOGY | Facility: CLINIC | Age: 60
End: 2025-06-09
Payer: MEDICAID

## 2025-06-09 PROCEDURE — 99204 OFFICE O/P NEW MOD 45 MIN: CPT | Mod: 93

## 2025-06-17 ENCOUNTER — APPOINTMENT (OUTPATIENT)
Dept: CARE COORDINATION | Facility: HOME HEALTH | Age: 60
End: 2025-06-17
Payer: MEDICAID

## 2025-06-17 PROCEDURE — 99349 HOME/RES VST EST MOD MDM 40: CPT | Mod: 95

## 2025-06-21 RX ORDER — LANCETS
EACH MISCELLANEOUS
Qty: 100 | Refills: 0 | Status: ACTIVE | COMMUNITY
Start: 2025-03-15

## 2025-06-21 RX ORDER — NAPROXEN 500 MG/1
500 TABLET ORAL
Qty: 180 | Refills: 0 | Status: ACTIVE | COMMUNITY
Start: 2025-04-05

## 2025-06-21 RX ORDER — ONDANSETRON 4 MG/1
4 TABLET ORAL
Qty: 20 | Refills: 0 | Status: ACTIVE | COMMUNITY
Start: 2025-03-22

## 2025-06-21 RX ORDER — EMPAGLIFLOZIN 25 MG/1
25 TABLET, FILM COATED ORAL
Qty: 90 | Refills: 0 | Status: DISCONTINUED | COMMUNITY
Start: 2025-04-24

## 2025-06-21 RX ORDER — METHOCARBAMOL 500 MG/1
500 TABLET, FILM COATED ORAL
Qty: 30 | Refills: 0 | Status: ACTIVE | COMMUNITY
Start: 2025-02-13

## 2025-06-21 RX ORDER — ASPIRIN 81 MG/1
81 TABLET, COATED ORAL
Qty: 90 | Refills: 0 | Status: ACTIVE | COMMUNITY
Start: 2024-11-29

## 2025-06-21 RX ORDER — AMOXICILLIN AND CLAVULANATE POTASSIUM 875; 125 MG/1; MG/1
875-125 TABLET, COATED ORAL
Qty: 20 | Refills: 0 | Status: DISCONTINUED | COMMUNITY
Start: 2025-03-22

## 2025-06-21 RX ORDER — BUDESONIDE AND FORMOTEROL FUMARATE DIHYDRATE 160; 4.5 UG/1; UG/1
160-4.5 AEROSOL RESPIRATORY (INHALATION)
Qty: 10 | Refills: 0 | Status: ACTIVE | COMMUNITY
Start: 2025-05-26

## 2025-06-21 RX ORDER — METOPROLOL SUCCINATE 100 MG/1
100 TABLET, EXTENDED RELEASE ORAL
Qty: 90 | Refills: 0 | Status: ACTIVE | COMMUNITY
Start: 2025-05-02

## 2025-06-21 RX ORDER — PEN NEEDLE, DIABETIC 32 GX 1/4"
32G X 6 MM NEEDLE, DISPOSABLE MISCELLANEOUS
Qty: 100 | Refills: 0 | Status: ACTIVE | COMMUNITY
Start: 2025-04-07

## 2025-06-21 RX ORDER — BLOOD SUGAR DIAGNOSTIC
STRIP MISCELLANEOUS
Qty: 100 | Refills: 0 | Status: ACTIVE | COMMUNITY
Start: 2025-06-14

## 2025-06-21 RX ORDER — IPRATROPIUM BROMIDE AND ALBUTEROL SULFATE 2.5; .5 MG/3ML; MG/3ML
0.5-2.5 (3) SOLUTION RESPIRATORY (INHALATION)
Qty: 360 | Refills: 0 | Status: ACTIVE | COMMUNITY
Start: 2025-06-06

## 2025-06-21 RX ORDER — METFORMIN HYDROCHLORIDE 1000 MG/1
1000 TABLET, COATED ORAL
Qty: 180 | Refills: 0 | Status: ACTIVE | COMMUNITY
Start: 2025-04-16

## 2025-06-21 RX ORDER — PREDNISONE 20 MG/1
20 TABLET ORAL
Qty: 6 | Refills: 0 | Status: ACTIVE | COMMUNITY
Start: 2025-06-06

## 2025-06-21 RX ORDER — PEAK FLOW METER
EACH MISCELLANEOUS
Qty: 1 | Refills: 0 | Status: ACTIVE | COMMUNITY
Start: 2025-06-08

## 2025-06-21 RX ORDER — MONTELUKAST 10 MG/1
10 TABLET, FILM COATED ORAL
Qty: 90 | Refills: 0 | Status: ACTIVE | COMMUNITY
Start: 2025-05-17

## 2025-06-21 RX ORDER — FLUTICASONE FUROATE AND VILANTEROL TRIFENATATE 200; 25 UG/1; UG/1
200-25 POWDER RESPIRATORY (INHALATION)
Qty: 180 | Refills: 0 | Status: ACTIVE | COMMUNITY
Start: 2025-02-12

## 2025-06-21 RX ORDER — ATORVASTATIN CALCIUM 20 MG/1
20 TABLET, FILM COATED ORAL
Qty: 90 | Refills: 0 | Status: ACTIVE | COMMUNITY
Start: 2024-11-29

## 2025-06-21 RX ORDER — LOSARTAN POTASSIUM AND HYDROCHLOROTHIAZIDE 25; 100 MG/1; MG/1
100-25 TABLET ORAL
Qty: 90 | Refills: 0 | Status: ACTIVE | COMMUNITY
Start: 2024-11-29

## 2025-06-21 RX ORDER — FUROSEMIDE 20 MG/1
20 TABLET ORAL
Qty: 15 | Refills: 0 | Status: ACTIVE | COMMUNITY
Start: 2025-05-26

## 2025-06-21 RX ORDER — GABAPENTIN 300 MG/1
300 CAPSULE ORAL
Qty: 180 | Refills: 0 | Status: ACTIVE | COMMUNITY
Start: 2025-03-27

## 2025-06-21 RX ORDER — IPRATROPIUM BROMIDE 21 UG/1
0.03 SPRAY, METERED NASAL
Qty: 30 | Refills: 0 | Status: ACTIVE | COMMUNITY
Start: 2025-03-03

## 2025-06-21 RX ORDER — ALCOHOL ANTISEPTIC PADS
70 PADS, MEDICATED (EA) TOPICAL
Qty: 100 | Refills: 0 | Status: ACTIVE | COMMUNITY
Start: 2025-04-04

## 2025-06-21 RX ORDER — AMLODIPINE BESYLATE 10 MG/1
10 TABLET ORAL
Qty: 90 | Refills: 0 | Status: ACTIVE | COMMUNITY
Start: 2025-02-23

## 2025-06-21 RX ORDER — ALBUTEROL SULFATE 90 UG/1
108 (90 BASE) INHALANT RESPIRATORY (INHALATION)
Qty: 8 | Refills: 0 | Status: ACTIVE | COMMUNITY
Start: 2025-06-06

## 2025-06-21 RX ORDER — SPIRONOLACTONE 25 MG/1
25 TABLET ORAL
Qty: 90 | Refills: 0 | Status: ACTIVE | COMMUNITY
Start: 2025-06-10

## 2025-06-21 RX ORDER — SEMAGLUTIDE 2.68 MG/ML
8 INJECTION, SOLUTION SUBCUTANEOUS
Qty: 9 | Refills: 0 | Status: ACTIVE | COMMUNITY
Start: 2025-02-27

## 2025-06-21 RX ORDER — LOSARTAN POTASSIUM 100 MG/1
100 TABLET, FILM COATED ORAL
Qty: 90 | Refills: 0 | Status: DISCONTINUED | COMMUNITY
Start: 2025-05-02

## 2025-06-21 RX ORDER — INSULIN GLARGINE 100 [IU]/ML
100 INJECTION, SOLUTION SUBCUTANEOUS
Qty: 27 | Refills: 0 | Status: ACTIVE | COMMUNITY
Start: 2025-03-17

## 2025-06-21 RX ORDER — CLOTRIMAZOLE AND BETAMETHASONE DIPROPIONATE 10; .5 MG/G; MG/G
1-0.05 CREAM TOPICAL
Qty: 45 | Refills: 0 | Status: ACTIVE | COMMUNITY
Start: 2025-05-29